# Patient Record
Sex: FEMALE | Race: WHITE | Employment: OTHER | URBAN - METROPOLITAN AREA
[De-identification: names, ages, dates, MRNs, and addresses within clinical notes are randomized per-mention and may not be internally consistent; named-entity substitution may affect disease eponyms.]

---

## 2018-02-22 PROBLEM — N13.30 HYDRONEPHROSIS: Status: ACTIVE | Noted: 2018-02-22

## 2018-06-19 PROBLEM — F41.9 ANXIETY: Status: ACTIVE | Noted: 2018-06-19

## 2018-08-23 PROBLEM — K11.6 RANULA: Status: ACTIVE | Noted: 2018-08-23

## 2021-01-07 PROBLEM — R50.9 FEVER: Status: ACTIVE | Noted: 2021-01-07

## 2021-01-07 PROBLEM — R06.02 SOB (SHORTNESS OF BREATH): Status: ACTIVE | Noted: 2021-01-07

## 2022-05-16 ENCOUNTER — APPOINTMENT (OUTPATIENT)
Dept: CT IMAGING | Age: 82
End: 2022-05-16
Payer: COMMERCIAL

## 2022-05-16 ENCOUNTER — HOSPITAL ENCOUNTER (OUTPATIENT)
Age: 82
Setting detail: OBSERVATION
Discharge: HOME | End: 2022-05-19
Attending: EMERGENCY MEDICINE | Admitting: SURGERY
Payer: COMMERCIAL

## 2022-05-16 ENCOUNTER — APPOINTMENT (OUTPATIENT)
Dept: GENERAL RADIOLOGY | Age: 82
End: 2022-05-16
Payer: COMMERCIAL
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
## 2022-05-16 DIAGNOSIS — S82.832A CLOSED FRACTURE OF DISTAL END OF LEFT FIBULA, UNSPECIFIED FRACTURE MORPHOLOGY, INITIAL ENCOUNTER: Primary | ICD-10-CM

## 2022-05-16 LAB — VITAMIN D 25-HYDROXY: 30.3 NG/ML

## 2022-05-16 PROCEDURE — 70450 CT HEAD/BRAIN W/O DYE: CPT

## 2022-05-16 PROCEDURE — 6370000000 HC RX 637 (ALT 250 FOR IP): Performed by: STUDENT IN AN ORGANIZED HEALTH CARE EDUCATION/TRAINING PROGRAM

## 2022-05-16 PROCEDURE — 82565 ASSAY OF CREATININE: CPT

## 2022-05-16 PROCEDURE — 73610 X-RAY EXAM OF ANKLE: CPT

## 2022-05-16 PROCEDURE — 99285 EMERGENCY DEPT VISIT HI MDM: CPT

## 2022-05-16 PROCEDURE — 72125 CT NECK SPINE W/O DYE: CPT

## 2022-05-16 PROCEDURE — 82947 ASSAY GLUCOSE BLOOD QUANT: CPT

## 2022-05-16 PROCEDURE — 6360000002 HC RX W HCPCS: Performed by: STUDENT IN AN ORGANIZED HEALTH CARE EDUCATION/TRAINING PROGRAM

## 2022-05-16 PROCEDURE — 84520 ASSAY OF UREA NITROGEN: CPT

## 2022-05-16 PROCEDURE — 85027 COMPLETE CBC AUTOMATED: CPT

## 2022-05-16 PROCEDURE — G0480 DRUG TEST DEF 1-7 CLASSES: HCPCS

## 2022-05-16 PROCEDURE — 73590 X-RAY EXAM OF LOWER LEG: CPT

## 2022-05-16 PROCEDURE — 96376 TX/PRO/DX INJ SAME DRUG ADON: CPT

## 2022-05-16 PROCEDURE — 3209999900 CT LUMBAR SPINE TRAUMA RECONSTRUCTION

## 2022-05-16 PROCEDURE — 85610 PROTHROMBIN TIME: CPT

## 2022-05-16 PROCEDURE — 74176 CT ABD & PELVIS W/O CONTRAST: CPT

## 2022-05-16 PROCEDURE — 73600 X-RAY EXAM OF ANKLE: CPT

## 2022-05-16 PROCEDURE — 96374 THER/PROPH/DIAG INJ IV PUSH: CPT

## 2022-05-16 PROCEDURE — 80051 ELECTROLYTE PANEL: CPT

## 2022-05-16 PROCEDURE — 96375 TX/PRO/DX INJ NEW DRUG ADDON: CPT

## 2022-05-16 PROCEDURE — 3209999900 CT THORACIC SPINE TRAUMA RECONSTRUCTION

## 2022-05-16 PROCEDURE — 82306 VITAMIN D 25 HYDROXY: CPT

## 2022-05-16 RX ORDER — FENTANYL CITRATE 50 UG/ML
50 INJECTION, SOLUTION INTRAMUSCULAR; INTRAVENOUS ONCE
Status: COMPLETED | OUTPATIENT
Start: 2022-05-16 | End: 2022-05-16

## 2022-05-16 RX ORDER — MORPHINE SULFATE 4 MG/ML
2 INJECTION, SOLUTION INTRAMUSCULAR; INTRAVENOUS ONCE
Status: COMPLETED | OUTPATIENT
Start: 2022-05-16 | End: 2022-05-16

## 2022-05-16 RX ORDER — OXYCODONE HYDROCHLORIDE 5 MG/1
5 TABLET ORAL ONCE
Status: COMPLETED | OUTPATIENT
Start: 2022-05-16 | End: 2022-05-16

## 2022-05-16 RX ORDER — ONDANSETRON 2 MG/ML
4 INJECTION INTRAMUSCULAR; INTRAVENOUS ONCE
Status: COMPLETED | OUTPATIENT
Start: 2022-05-16 | End: 2022-05-16

## 2022-05-16 RX ADMIN — FENTANYL CITRATE 50 MCG: 50 INJECTION, SOLUTION INTRAMUSCULAR; INTRAVENOUS at 17:39

## 2022-05-16 RX ADMIN — ONDANSETRON 4 MG: 2 INJECTION INTRAMUSCULAR; INTRAVENOUS at 17:39

## 2022-05-16 RX ADMIN — MORPHINE SULFATE 2 MG: 4 INJECTION INTRAVENOUS at 19:49

## 2022-05-16 RX ADMIN — OXYCODONE 5 MG: 5 TABLET ORAL at 22:54

## 2022-05-16 RX ADMIN — ONDANSETRON 4 MG: 2 INJECTION INTRAMUSCULAR; INTRAVENOUS at 19:49

## 2022-05-16 ASSESSMENT — PAIN SCALES - GENERAL: PAINLEVEL_OUTOF10: 9

## 2022-05-16 ASSESSMENT — ENCOUNTER SYMPTOMS
DIARRHEA: 0
SHORTNESS OF BREATH: 0
ABDOMINAL PAIN: 0
RHINORRHEA: 0
VOMITING: 0
BACK PAIN: 0
COUGH: 0
CONSTIPATION: 0
NAUSEA: 1
FACIAL SWELLING: 0

## 2022-05-16 ASSESSMENT — PAIN DESCRIPTION - LOCATION: LOCATION: ANKLE

## 2022-05-16 ASSESSMENT — PAIN - FUNCTIONAL ASSESSMENT: PAIN_FUNCTIONAL_ASSESSMENT: 0-10

## 2022-05-16 ASSESSMENT — PAIN DESCRIPTION - ORIENTATION: ORIENTATION: LEFT

## 2022-05-16 NOTE — ED NOTES
Patient transported by EMS after fall this afternoon. Pt states that she was walking up steps outside when her foot got caught on an uneven step and she fell onto right knee. Pt c/o left ankle pain with right hip and right knee pain. Pt denies LOC or hitting head. Pt denies being dizzy or any other symptoms at time of fall. PMS x4 intact. Pt A+Ox4 on arrival, respirations even and unlabored, speaking in complete sentences. Spouse at bedside.       Norman Rosas RN  05/16/22 [facility] Francesco RN  05/16/22 [id number]

## 2022-05-16 NOTE — H&P
TRAUMA HISTORY AND PHYSICAL EXAMINATION    PATIENT NAME: Radha Walton  YOB: 1940  MEDICAL RECORD NO. [id number]   DATE: 5/17/2022  PRIMARY CARE PHYSICIAN: Anand Rice MD  PATIENT EVALUATED AT THE REQUEST OF : Soha    ACTIVATION   []Trauma Alert     [] Trauma Priority     [x]Trauma Consult. IMPRESSION:     Patient Active Problem List   Diagnosis    Diabetes mellitus, type II ([facility]    HTN (hypertension)    Hypercholesteremia    Fatigue    Vitamin B12 deficiency    Vitamin D deficiency    Breast cancer ([facility]    Depression    Hydronephrosis    Anxiety    Ranula    Fever    SOB (shortness of breath)    Traumatic closed nondisplaced fracture of distal fibula, left, initial encounter       MEDICAL DECISION MAKING AND PLAN:     78 yo F, fall up 1 step. No LOC. On ASA and plavix  - Left distal fibula fracture extending into lateral malleolus  - CT head and C spine negative  - CT pan scan pending  - Orthopedic surgery consult  - LLE splinted at bedside by ortho  - [facility]    [] Neurosurgery     [x] Orthopedic Surgery    [] Cardiothoracic     [] Facial Trauma    [] Plastic Surgery (Burn)    [] Pediatric Surgery     [] Internal Medicine    [] Pulmonary Medicine    [] Other:        HISTORY:     Chief Complaint:  Left leg hurts    INJURY SUMMARY  Left distal fibula fracture extending into lateral malleolus    If intracranial hemorrhage is present, is it a:  [] BIG 1  [] BIG 2  [] BIG 3    GENERAL DATA  Age 79 y.o.  female   Patient information was obtained from patient and spouse/SO. History/Exam limitations: none.   Patient presented to the Emergency Department by ambulance where the patient received see Ambulance Run Sheet prior to arrival.  Injury Date: 5/17/2022   Approximate Injury Time: 5:00 PM        Transport mode:   [x]Ambulance      [] Helicopter     []Car       [] Other    INJURY LOCATION, (e.g., home, farm, industry, street)  Specific Details of Location (e.g., bedroom, kitchen, garage): Basement staircase  Type of Residence (if occurred in home setting) (e.g., apartment, mobile home, single family home): Single family home    MECHANISM OF INJURY         [x] Fall    []From Standing     []From Height  Ft     [x]Up Stairs _1__steps    HISTORY:     Rodriguez Palomo is a 79 y.o. female that presented to the Emergency Department following a fall earlier today. She was walking upstairs from her basement and tripped up one step, landing on her right knee and her left foot became caught on the step below. She denies head trauma, denies LOC. Denies any other injuries. She is on ASA and plavix for a history of DVT and PE. She endorses LLE pain and a lumbar paraspinal throbbing. She has a history of spinal stenosis with neurogenic claudication, s/p repair in 2019, hx of CVA, breast cancer, DVT and PE on ASA and plavix, CAD, DM, HTN, asthma. She denies CP, SOB, changes in vision, headache, neck pain, abdominal pain, vomiting, numbness, tingling, weakness. Loss of Consciousness [x]No   []Yes Duration(min)       [] Unknown     Total Fluids Given Prior To Arrival  mL    MEDICATIONS:   []  None     []  Information not available due to exam limitations documented above    Prior to Admission medications    Medication Sig Start Date End Date Taking? Authorizing Provider   vitamin D (ERGOCALCIFEROL) 1.25 MG (93556 UT) CAPS capsule Take 1 capsule by mouth once a week 4/18/22   Ebenezer Mcleod MD   clopidogrel (PLAVIX) 75 MG tablet qd 4/13/22   Ebenezer Mcleod MD   ALPRAZolam [facility]) 0.5 MG tablet Take 1 tablet by mouth nightly as needed for Sleep for up to 90 days.  4/13/22 7/12/22  Ebenezer Mcleod MD   atenolol (TENORMIN) 50 MG tablet take 1 tablet by mouth twice a day 3/12/22   Ebenezer Mcleod MD   metFORMIN (GLUCOPHAGE) 500 MG tablet take 1 tablet by mouth twice a day with food 3/12/22   Ebenezer Mcleod MD   hydroCHLOROthiazide (HYDRODIURIL) 25 MG tablet take 1 tablet by mouth once daily 2/16/22   Coni Jason MD   pravastatin (PRAVACHOL) 40 MG tablet take 1 tablet by mouth once daily 2/16/22   Coni Jason MD   amLODIPine (NORVASC) 5 MG tablet take 1 tablet by mouth once daily 2/16/22   Coni Jason MD   gabapentin (NEURONTIN) 300 MG capsule take 1 capsule by mouth three times a day 1/25/22 2/25/22  Coni Jason MD   ondansetron (ZOFRAN-ODT) 4 MG disintegrating tablet Take 4 mg by mouth every 8 hours as needed 10/11/21   Historical Provider, MD   sulfamethoxazole-trimethoprim (BACTRIM;SEPTRA) 400-80 MG per tablet take 1 tablet by mouth twice a day for 10 days 8/18/21   Coni Jason MD   acetaminophen-codeine (TYLENOL #3) 300-30 MG per tablet take 1 tablet by mouth every 4 to 6 hours if needed 6/29/21   Historical Provider, MD   amoxicillin (AMOXIL) 500 MG capsule take 2 capsule immediately then take 1 capsule by mouth three times a day until finished 6/21/21   Historical Provider, MD   penicillin v potassium (VEETID) 500 MG tablet take 1 tablet by mouth every 6 hours 6/29/21   Historical Provider, MD   Handicap Placard MISC by Does not apply route 5 years 8/2/21   Coni Jason MD   clindamycin (CLEOCIN) 300 MG capsule  6/20/21   Historical Provider, MD   tiotropium (SPIRIVA RESPIMAT) 1.25 MCG/ACT AERS inhaler Inhale into the lungs    Historical Provider, MD   FLUoxetine (PROZAC) 20 MG capsule take 1 capsule by mouth twice a day 1/28/21   Coni Jason MD   vitamin D (CHOLECALCIFEROL) 25 MCG (1000 UT) TABS tablet Take 1,000 Units by mouth daily    Historical Provider, MD   Fluticasone-Salmeterol 55-14 MCG/ACT AEPB Inhale 1 puff into the lungs 2 times daily 2/6/20   Historical Provider, MD   atenolol (TENORMIN) 50 MG tablet Take one tab po twice daily 9/21/20   Coni Jason MD   atenolol (TENORMIN) 50 MG tablet take 1 tablet by mouth twice a day 9/21/20   Coni Jason MD   Lancets MISC TEST ONCE DAILY BEFORE A MEAL AS DIRECTED BY PHYSICIAN; DX DM II (E11.9) 2/24/20   Carolyn Bishop MD   blood glucose monitor strips TEST ONCE DAILY BEFORE A MEAL AS DIRECTED BY PHYSICIAN; DX DM II (E11.9) 2/24/20   Carolyn Bishop MD   blood glucose monitor kit and supplies TEST ONCE DAILY BEFORE A MEAL AS DIRECTED BY PHYSICIAN; DX DM II (E11.9) 2/24/20   Carolyn Bishop MD   Umeclidinium Bromide (INCRUSE ELLIPTA) 62.5 MCG/INH AEPB Inhale 1 puff into the lungs Daily with lunch 11/11/19   Carolyn Bishop MD   nystatin-triamcinolone [facility] II) 874859-9.1 UNIT/GM-% cream Apply topically 2 times daily. Patient taking differently: Indications: prn Apply topically 2 times daily.  8/20/19   Carolyn Bishop MD   vitamin B-12 (CYANOCOBALAMIN) 100 MCG tablet Take 50 mcg by mouth daily    Historical Provider, MD   ascorbic acid (VITAMIN C) 500 MG tablet Take 500 mg by mouth daily    Historical Provider, MD   trimethoprim (TRIMPEX) 100 MG tablet Take 100 mg by mouth  2/20/18   Historical Provider, MD   albuterol sulfate HFA (PROAIR HFA) 108 (90 Base) MCG/ACT inhaler Inhale 2 puffs into the lungs every 6 hours as needed for Wheezing 2/22/18   Carolyn Bishop MD   aspirin 81 MG EC tablet take 1 tablet by mouth once daily 2/15/18   Historical Provider, MD       ALLERGIES:   []  None    []   Information not available due to exam limitations documented above     Azithromycin, Compazine [prochlorperazine maleate], Mirabegron, Oxybutynin, and Prochlorperazine    PAST MEDICAL HISTORY: []  None   []   Information not available due to exam limitations documented above      has a past medical history of Allergic rhinitis, Anxiety, Arthritis, Asthma, Breast cancer ([address] Breast cancer genetic susceptibility, CAD (coronary artery disease), Cancer ([address] Deep vein blood clot of right lower extremity ([address] Diabetes mellitus ([address] Diabetes mellitus type II, Fatigue, HTN (hypertension), Hypercholesteremia, Hyperlipidemia, Hypertension, Psychiatric problem, Pulmonary emboli ([address] Unspecified cerebral artery occlusion with cerebral infarction, Vitamin B12 deficiency, and Vitamin D deficiency. has a past surgical history that includes Cardiac catheterization (Right); Breast lumpectomy (Right); Breast lumpectomy; and lymph node dissection (Right). FAMILY HISTORY   []   Information not available due to exam limitations documented above    family history includes Cancer in an other family member; Heart Disease in her father and mother. SOCIAL HISTORY  []   Information not available due to exam limitations documented above     reports that she has never smoked. She has never used smokeless tobacco.   reports no history of alcohol use. reports no history of drug use. Review of Systems:    []   Information not available due to exam limitations documented above    Review of Systems   Constitutional: Negative for activity change, appetite change, chills and fever. HENT: Negative for congestion, facial swelling, hearing loss, nosebleeds and rhinorrhea. Eyes: Negative for visual disturbance. Respiratory: Negative for cough and shortness of breath. Cardiovascular: Negative for chest pain and palpitations. Gastrointestinal: Positive for nausea. Negative for abdominal pain, constipation, diarrhea and vomiting. Genitourinary: Negative for dysuria. Musculoskeletal: Positive for arthralgias (LLE pain) and myalgias (Right sided paraspinal lumbar pain). Negative for back pain, neck pain and neck stiffness. Skin: Negative for wound. Neurological: Negative for dizziness, tremors, seizures, syncope, speech difficulty, weakness, light-headedness, numbness and headaches.            PHYSICAL EXAMINATION:     GLASCOW COMA SCALE  NEUROMUSCULAR BLOCKADE PRIOR TO ARRIVAL     [x]No        []Yes      Variable  Score   Variable  Score  Eye opening [x]Spontaneous 4 Verbal  [x]Oriented  5     []To voice  3   []Confused  4    []To pain  2   []Inapp words  3    []None  1   []Incomp CHEST ABDOMEN PELVIS WO CONTRAST   Final Result   CT of thoracic spine: No acute osseous abnormality. No acute fracture. CT of lumbar spine: No acute osseous abnormality. No acute fracture. Changes related to instrumented disc space and posterior fusion and posterior   decompression at L3-L4 and L4-L5. CT of chest abdomen and pelvis:      No acute traumatic injury within the chest abdomen and pelvis. Extensive abnormalities within the right breast with multiple nodular soft   tissues with foci of calcification. Extensive abnormalities within the right breast with multiple nodular soft   tissues with foci of calcification. Mammographic correlation is recommended. CT LUMBAR SPINE TRAUMA RECONSTRUCTION   Final Result   CT of thoracic spine: No acute osseous abnormality. No acute fracture. CT of lumbar spine: No acute osseous abnormality. No acute fracture. Changes related to instrumented disc space and posterior fusion and posterior   decompression at L3-L4 and L4-L5. CT of chest abdomen and pelvis:      No acute traumatic injury within the chest abdomen and pelvis. Extensive abnormalities within the right breast with multiple nodular soft   tissues with foci of calcification. Extensive abnormalities within the right breast with multiple nodular soft   tissues with foci of calcification. Mammographic correlation is recommended. CT THORACIC SPINE TRAUMA RECONSTRUCTION   Final Result   CT of thoracic spine: No acute osseous abnormality. No acute fracture. CT of lumbar spine: No acute osseous abnormality. No acute fracture. Changes related to instrumented disc space and posterior fusion and posterior   decompression at L3-L4 and L4-L5. CT of chest abdomen and pelvis:      No acute traumatic injury within the chest abdomen and pelvis.       Extensive abnormalities within the right breast with multiple nodular soft   tissues with foci of calcification. Extensive abnormalities within the right breast with multiple nodular soft   tissues with foci of calcification. Mammographic correlation is recommended. XR ANKLE LEFT (MIN 3 VIEWS)   Final Result   No significant change in alignment of a Stein type B fracture of the distal   left fibula status post splinting. Overlying casting material obscures fine   bony detail. XR ANKLE LEFT (MIN 3 VIEWS)   Final Result   Fracture of the distal fibula extending into the lateral malleolus         XR ANKLE LEFT (MIN 3 VIEWS)   Final Result   Fracture of the distal fibula extending into the lateral malleolus         XR TIBIA FIBULA LEFT (2 VIEWS)   Final Result   Fracture of the distal fibula extending into the lateral malleolus         CT HEAD WO CONTRAST   Final Result   No acute intracranial abnormality. Generalized atrophy and chronic small vessel ischemic white matter disease. RECOMMENDATIONS:   Unavailable         CT CERVICAL SPINE WO CONTRAST   Final Result   Multilevel degenerative changes with no acute fracture or traumatic   malalignment.                LABS    Labs Reviewed   VITAMIN D [facility] [place]   TRAUMA PANEL         Sanchez Moss MD  5/16/22, 2:24 AM

## 2022-05-16 NOTE — CONSULTS
Orthopedic Surgery Consult  (Dr. Robert Jimenez)                   CC/Reason for consult:  FFSH/Left ankle fracture    HPI:    The patient is a 79 y.o. female who we are consulted on for evaluation and management of a left ankle injury after she sustained a fall from standing height. She states she was at home and twisted her foot on the first step of the stairs and fell down. She had immediate pain and swelling to the left ankle after the fall and was unable to walk. She has no prior history of left ankle injury. She lives at home with her . Denies numbness/tingling. She ambulates on her own at baseline without assistive devices. Past Medical History:    Past Medical History:   Diagnosis Date    Allergic rhinitis     pollen    Anxiety     Arthritis     Asthma     Breast cancer ([facility] 9/10/2014    Breast cancer genetic susceptibility     CAD (coronary artery disease)     Cancer ([facility]     Deep vein blood clot of right lower extremity ([facility]     Diabetes mellitus ([facility]     prediabetes    Diabetes mellitus type II 9/10/2014    Fatigue 9/10/2014    HTN (hypertension) 9/10/2014    Hypercholesteremia 9/10/2014    Hyperlipidemia     Hypertension     Psychiatric problem     depression, anxiety    Pulmonary emboli ([address]     Unspecified cerebral artery occlusion with cerebral infarction     Vitamin B12 deficiency 9/10/2014    Vitamin D deficiency 9/10/2014       Past Surgical History:    Past Surgical History:   Procedure Laterality Date    BREAST LUMPECTOMY Right     BREAST LUMPECTOMY      CARDIAC CATHETERIZATION Right     LYMPH NODE DISSECTION Right     right armpit       Medications Prior to Admission:   Prior to Admission medications    Medication Sig Start Date End Date Taking?  Authorizing Provider   vitamin D (ERGOCALCIFEROL) 1.25 MG (21868 UT) CAPS capsule Take 1 capsule by mouth once a week 4/18/22   Kylah Pittman MD   clopidogrel (PLAVIX) 75 MG tablet qd 4/13/22   Kylah Pittman MD ALPRAZolam (XANAX) 0.5 MG tablet Take 1 tablet by mouth nightly as needed for Sleep for up to 90 days.  4/13/22 7/12/22  Gilson Connolly MD   atenolol (TENORMIN) 50 MG tablet take 1 tablet by mouth twice a day 3/12/22   Gilson Connolly MD   metFORMIN (GLUCOPHAGE) 500 MG tablet take 1 tablet by mouth twice a day with food 3/12/22   Gilson Connolly MD   hydroCHLOROthiazide (HYDRODIURIL) 25 MG tablet take 1 tablet by mouth once daily 2/16/22   Gilson Connolly MD   pravastatin (PRAVACHOL) 40 MG tablet take 1 tablet by mouth once daily 2/16/22   Gilson Connolly MD   amLODIPine (NORVASC) 5 MG tablet take 1 tablet by mouth once daily 2/16/22   Gilson Connolly MD   gabapentin (NEURONTIN) 300 MG capsule take 1 capsule by mouth three times a day 1/25/22 2/25/22  Gilson Connolly MD   ondansetron (ZOFRAN-ODT) 4 MG disintegrating tablet Take 4 mg by mouth every 8 hours as needed 10/11/21   Historical Provider, MD   sulfamethoxazole-trimethoprim (BACTRIM;SEPTRA) 400-80 MG per tablet take 1 tablet by mouth twice a day for 10 days 8/18/21   Gilson Connolly MD   acetaminophen-codeine (TYLENOL #3) 300-30 MG per tablet take 1 tablet by mouth every 4 to 6 hours if needed 6/29/21   Historical Provider, MD   amoxicillin (AMOXIL) 500 MG capsule take 2 capsule immediately then take 1 capsule by mouth three times a day until finished 6/21/21   Historical Provider, MD   penicillin v potassium (VEETID) 500 MG tablet take 1 tablet by mouth every 6 hours 6/29/21   Historical Provider, MD   Handicap Placard MISC by Does not apply route 5 years 8/2/21   Gilson Connolly MD   clindamycin (CLEOCIN) 300 MG capsule  6/20/21   Historical Provider, MD   tiotropium (SPIRIVA RESPIMAT) 1.25 MCG/ACT AERS inhaler Inhale into the lungs    Historical Provider, MD   FLUoxetine (PROZAC) 20 MG capsule take 1 capsule by mouth twice a day 1/28/21   Gilson Connolly MD   vitamin D (CHOLECALCIFEROL) 25 MCG (1000 UT) TABS tablet Take 1,000 Units by mouth daily    Historical Provider, MD   Fluticasone-Salmeterol 55-14 MCG/ACT AEPB Inhale 1 puff into the lungs 2 times daily 2/6/20   Historical Provider, MD   atenolol (TENORMIN) 50 MG tablet Take one tab po twice daily 9/21/20   Kathy Muhammad MD   atenolol (TENORMIN) 50 MG tablet take 1 tablet by mouth twice a day 9/21/20   Kathy Muhammad MD   Lancets MISC TEST ONCE DAILY BEFORE A MEAL AS DIRECTED BY PHYSICIAN; DX DM II (E11.9) 2/24/20   Kathy Muhammad MD   blood glucose monitor strips TEST ONCE DAILY BEFORE A MEAL AS DIRECTED BY PHYSICIAN; DX DM II (E11.9) 2/24/20   Kathy Muhammad MD   blood glucose monitor kit and supplies TEST ONCE DAILY BEFORE A MEAL AS DIRECTED BY PHYSICIAN; DX DM II (E11.9) 2/24/20   Kathy Muhammad MD   Umeclidinium Bromide (INCRUSE ELLIPTA) 62.5 MCG/INH AEPB Inhale 1 puff into the lungs Daily with lunch 11/11/19   Kathy Muhammad MD   nystatin-triamcinolone [facility] II) 295705-8.1 UNIT/GM-% cream Apply topically 2 times daily. Patient taking differently: Indications: prn Apply topically 2 times daily.  8/20/19   Kathy Muhammad MD   vitamin B-12 (CYANOCOBALAMIN) 100 MCG tablet Take 50 mcg by mouth daily    Historical Provider, MD   ascorbic acid (VITAMIN C) 500 MG tablet Take 500 mg by mouth daily    Historical Provider, MD   trimethoprim (TRIMPEX) 100 MG tablet Take 100 mg by mouth  2/20/18   Historical Provider, MD   albuterol sulfate HFA (PROAIR HFA) 108 (90 Base) MCG/ACT inhaler Inhale 2 puffs into the lungs every 6 hours as needed for Wheezing 2/22/18   Kathy Muhammad MD   aspirin 81 MG EC tablet take 1 tablet by mouth once daily 2/15/18   Historical Provider, MD       Allergies:    Azithromycin, Compazine [prochlorperazine maleate], Mirabegron, Oxybutynin, and Prochlorperazine    Social History:   Social History     Socioeconomic History    Marital status:      Spouse name: Not on file    Number of children: Not on file    Years of education: Not on file    Highest education level: Not on file   Occupational History    Not on file   Tobacco Use    Smoking status: Never Smoker    Smokeless tobacco: Never Used   Substance and Sexual Activity    Alcohol use: No    Drug use: No    Sexual activity: Not on file   Other Topics Concern    Not on file   Social History Narrative    Not on file     Social Determinants of Health     Financial Resource Strain: Low Risk     Difficulty of Paying Living Expenses: Not hard at all   Food Insecurity: No Food Insecurity    Worried About [address] in the Last Year: Never true    [facility] in the Last Year: Never true   Transportation Needs:     Lack of Transportation (Medical): Not on file    Lack of Transportation (Non-Medical): Not on file   Physical Activity:     Days of Exercise per Week: Not on file    Minutes of Exercise per Session: Not on file   Stress:     Feeling of Stress : Not on file   Social Connections:     Frequency of Communication with Friends and Family: Not on file    Frequency of Social Gatherings with Friends and Family: Not on file    Attends Confucianism Services: Not on file    Active Member of [address] [place] [address] or Organizations: Not on file    Attends Club or Organization Meetings: Not on file    Marital Status: Not on file   Intimate Partner Violence:     Fear of Current or Ex-Partner: Not on file    Emotionally Abused: Not on file    Physically Abused: Not on file    Sexually Abused: Not on file   Housing Stability:     Unable to Pay for Housing in the Last Year: Not on file    Number of [place] in the Last Year: Not on file    Unstable Housing in the Last Year: Not on file       Family History:  Family History   Problem Relation Age of Onset    Heart Disease Mother     Heart Disease Father     Cancer Other        REVIEW OF SYSTEMS:    General: Negative for fever and chills. Cardiovascular: Negative for chest pain and palpitations.    Musculoskeletal: Positive for left ankle pain. See HPI   Neurological: Negative for numbness & tingling. 10 remaining systems reviewed and negative    PHYSICAL EXAM:  /71   Pulse 65   Temp 98.6 °F (37 °C) (Oral)   Resp 20   SpO2 97%     Gen: AAOx3, NAD, cooperative   Head: Normocephalic  Chest: Non labored breathing  Cardiovascular: Regular rate, no dependent edema, distal pulses 2+  Respiratory: Chest symmetric, no accessory muscle use, normal respirations     LLE: Ecchymosis and edema to the left ankle. TTP to the lateral malleolus. No TTP tot he medial malleolus. Non TTP to the knee. No ankle ROM due to pain. Able to wiggle all her toes freely. Skin intact. Compartments soft and compressible. Sural, saphenous, superificial/deep peroneal, and plantar nerve distribution SILT. Foot and toes warm and well-perfused w/ BCR; DP pulses 2+. -log roll. LABS:  No results for input(s): WBC, HGB, HCT, PLT, INR, PTT, NA, K, BUN, CREATININE, GLUCOSE, SEDRATE, CRP in the last 72 hours. Invalid input(s): PT     Radiology:    Xray imaging of the left ankle & tib-fib demonstrates a minimally displaced left distal fibula fracture. External rotation stress view of the ankle did demonstrate displacement of the lateral malleolus fracture as well as widening of the medial clear space. A/P: 79 y.o. female being seen after a fall from standing height with the following:    -Left bimalleolar equivalent ankle fracture    -No acute orthopedic intervention at this time. Will follow her outpatient for her left ankle fracture.  -Weight bearing: NWB LLE  -Short leg splint applied to the LLE and molded appropriately. Patient tolerated this well. Maintain splint to LLE.   -Pain control per ED  -Ice and elevation for pain/swelling  -F/u with Dr. Kalin García in 7-10 days  -Please page Ortho with any questions or concerns    Merary Saleh DO,   PGY-3 Orthopedic Surgery  6:25 PM 5/16/2022

## 2022-05-16 NOTE — ED PROVIDER NOTES
[address] [facility]     Emergency Department     Faculty Attestation        I performed a history and physical examination of the patient and discussed management with the resident. I reviewed the residents note and agree with the documented findings and plan of care. Any areas of disagreement are noted on the chart. I was personally present for the key portions of any procedures. I have documented in the chart those procedures where I was not present during the key portions. I have reviewed the emergency nurses triage note. I agree with the chief complaint, past medical history, past surgical history, allergies, medications, social and family history as documented unless otherwise noted below. For mid-level providers such as nurse practitioners as well as physicians assistants:    I have personally seen and evaluated the patient. I find the patient's history and physical exam are consistent with NP/PA documentation. I agree with the care provided, treatment rendered, disposition, & follow-up plan. Additional findings are as noted. Vital Signs: /71   Pulse 65   Temp 98.6 °F (37 °C) (Oral)   Resp 20   SpO2 97%   PCP:  Coni Jason MD    Pertinent Comments:     Mechanical fall with left lateral ankle pain. Awake alert and oriented.   No pain in the hip abdomen pelvis or chest      Critical Care  None          Jailyn Cole MD    Attending Emergency Medicine Physician              Marcelino Mills MD  05/16/22 8858

## 2022-05-16 NOTE — PROGRESS NOTES
I signed up for this patient in error. I did not contribute to the patient's care today.     Pamela Angel MD  Emergency Medicine PGY-2

## 2022-05-16 NOTE — ED PROVIDER NOTES
[facility]  ED  Emergency Department Encounter  EmergencyMedicine Resident     Pt Kiana Centeno  MRN: [id number]  Ana Lauragfjeannette 1940  Date of evaluation: 5/16/22  PCP:  Tyrus Phalen, MD    CHIEF COMPLAINT       Chief Complaint   Patient presents with    Ankle Pain    Fall       HISTORY OF PRESENT ILLNESS  (Location/Symptom, Timing/Onset, Context/Setting, Quality, Duration, Modifying Factors, Severity.)    This patient was evaluated in the Emergency Department for symptoms described in the history of present illness. He/she was evaluated in the context of the global COVID-19 pandemic, which necessitated consideration that the patient might be at risk for infection with the SARS-CoV-2 virus that causes COVID-19. Institutional protocols and algorithms that pertain to the evaluation of patients at risk for COVID-19 are in a state of rapid change based on information released by regulatory bodies including the CDC and federal and state organizations. These policies and algorithms were followed during the patient's care in the ED. Binh Juarez is a 79 y.o. female who presents to the ED today with complaints of severe left ankle pain after mechanical fall. Patient was outside gardening when she missed a step rolling her left ankle. Has severe pain that is 9/10 in severity, worse with movement. No associated numbness, tingling, weakness. Did fall but did not hit her head or neck although she does have a mild right-sided headache. Is on aspirin and Plavix for history of stroke. No neck or back pain. Denies any other injury.     PAST MEDICAL / SURGICAL / SOCIAL / FAMILY HISTORY      has a past medical history of Allergic rhinitis, Anxiety, Arthritis, Asthma, Breast cancer ([address] Breast cancer genetic susceptibility, CAD (coronary artery disease), Cancer ([address] Deep vein blood clot of right lower extremity ([address] Diabetes mellitus ([address] Diabetes mellitus type II, Fatigue, HTN (hypertension), Hypercholesteremia, Hyperlipidemia, Hypertension, Psychiatric problem, Pulmonary emboli ([address] Unspecified cerebral artery occlusion with cerebral infarction, Vitamin B12 deficiency, and Vitamin D deficiency. has a past surgical history that includes Cardiac catheterization (Right); Breast lumpectomy (Right); Breast lumpectomy; and lymph node dissection (Right). Social History     Socioeconomic History    Marital status:      Spouse name: Not on file    Number of children: Not on file    Years of education: Not on file    Highest education level: Not on file   Occupational History    Not on file   Tobacco Use    Smoking status: Never Smoker    Smokeless tobacco: Never Used   Substance and Sexual Activity    Alcohol use: No    Drug use: No    Sexual activity: Not on file   Other Topics Concern    Not on file   Social History Narrative    Not on file     Social Determinants of Health     Financial Resource Strain: Low Risk     Difficulty of Paying Living Expenses: Not hard at all   Food Insecurity: No Food Insecurity    Worried About [address] in the Last Year: Never true    [address] in the Last Year: Never true   Transportation Needs:     Lack of Transportation (Medical): Not on file    Lack of Transportation (Non-Medical):  Not on file   Physical Activity:     Days of Exercise per Week: Not on file    Minutes of Exercise per Session: Not on file   Stress:     Feeling of Stress : Not on file   Social Connections:     Frequency of Communication with Friends and Family: Not on file    Frequency of Social Gatherings with Friends and Family: Not on file    Attends Orthodox Services: Not on file    Active Member of Clubs or Organizations: Not on file    Attends Club or Organization Meetings: Not on file    Marital Status: Not on file   Intimate Partner Violence:     Fear of Current or Ex-Partner: Not on file    Emotionally Abused: Not on file   Ardyth Moritz Physically Abused: Not on file    Sexually Abused: Not on file   Housing Stability:     Unable to Pay for Housing in the Last Year: Not on file    Number of Places Lived in the Last Year: Not on file    Unstable Housing in the Last Year: Not on file       Family History   Problem Relation Age of Onset    Heart Disease Mother     Heart Disease Father     Cancer Other        Allergies:  Azithromycin, Compazine [prochlorperazine maleate], Mirabegron, Oxybutynin, and Prochlorperazine    Home Medications:  Prior to Admission medications    Medication Sig Start Date End Date Taking? Authorizing Provider   vitamin D (ERGOCALCIFEROL) 1.25 MG (97104 UT) CAPS capsule Take 1 capsule by mouth once a week 4/18/22   Shwetha Ortiz MD   clopidogrel (PLAVIX) 75 MG tablet qd 4/13/22   Shwetha Ortiz MD   ALPRAZolam Alivia De Los Santos) 0.5 MG tablet Take 1 tablet by mouth nightly as needed for Sleep for up to 90 days.  4/13/22 7/12/22  Shwetha Ortiz MD   atenolol (TENORMIN) 50 MG tablet take 1 tablet by mouth twice a day 3/12/22   Shwetha Ortiz MD   metFORMIN (GLUCOPHAGE) 500 MG tablet take 1 tablet by mouth twice a day with food 3/12/22   Shwetha Ortiz MD   hydroCHLOROthiazide (HYDRODIURIL) 25 MG tablet take 1 tablet by mouth once daily 2/16/22   Shwetha Ortiz MD   pravastatin (PRAVACHOL) 40 MG tablet take 1 tablet by mouth once daily 2/16/22   Shwetha Ortiz MD   amLODIPine (NORVASC) 5 MG tablet take 1 tablet by mouth once daily 2/16/22   Shwetha Ortiz MD   gabapentin (NEURONTIN) 300 MG capsule take 1 capsule by mouth three times a day 1/25/22 2/25/22  Shwetha Ortiz MD   ondansetron (ZOFRAN-ODT) 4 MG disintegrating tablet Take 4 mg by mouth every 8 hours as needed 10/11/21   Historical Provider, MD   sulfamethoxazole-trimethoprim (BACTRIM;SEPTRA) 400-80 MG per tablet take 1 tablet by mouth twice a day for 10 days 8/18/21   Shwetha Ortiz MD   acetaminophen-codeine (TYLENOL #3) 300-30 MG per tablet take 1 tablet by mouth every 4 to 6 hours if needed 6/29/21   Historical Provider, MD   amoxicillin (AMOXIL) 500 MG capsule take 2 capsule immediately then take 1 capsule by mouth three times a day until finished 6/21/21   Historical Provider, MD   penicillin v potassium (VEETID) 500 MG tablet take 1 tablet by mouth every 6 hours 6/29/21   Historical Provider, MD   Trini Tomlin [facility] by Does not apply route 5 years 8/2/21   Antoine Ramírez MD   clindamycin (CLEOCIN) 300 MG capsule  6/20/21   Historical Provider, MD   tiotropium (SPIRIVA RESPIMAT) 1.25 MCG/ACT AERS inhaler Inhale into the lungs    Historical Provider, MD   FLUoxetine (PROZAC) 20 MG capsule take 1 capsule by mouth twice a day 1/28/21   Antoine Ramírez MD   vitamin D (CHOLECALCIFEROL) 25 MCG (1000 UT) TABS tablet Take 1,000 Units by mouth daily    Historical Provider, MD   Fluticasone-Salmeterol 55-14 MCG/ACT AEPB Inhale 1 puff into the lungs 2 times daily 2/6/20   Historical Provider, MD   atenolol (TENORMIN) 50 MG tablet Take one tab po twice daily 9/21/20   Antoine Ramírez MD   atenolol (TENORMIN) 50 MG tablet take 1 tablet by mouth twice a day 9/21/20   Antoine Ramírez MD   Lancets MISC TEST ONCE DAILY BEFORE A MEAL AS DIRECTED BY PHYSICIAN; DX DM II (E11.9) 2/24/20   Antoine Ramírez MD   blood glucose monitor strips TEST ONCE DAILY BEFORE A MEAL AS DIRECTED BY PHYSICIAN; DX DM II (E11.9) 2/24/20   Antoine Ramírez MD   blood glucose monitor kit and supplies TEST ONCE DAILY BEFORE A MEAL AS DIRECTED BY PHYSICIAN; DX DM II (E11.9) 2/24/20   Antoine Ramírez MD   Umeclidinium Bromide (INCRUSE ELLIPTA) 62.5 MCG/INH AEPB Inhale 1 puff into the lungs Daily with lunch 11/11/19   Antoine Ramírez MD   nystatin-triamcinolone [facility] II) 319603-0.1 UNIT/GM-% cream Apply topically 2 times daily. Patient taking differently: Indications: prn Apply topically 2 times daily.  8/20/19   Antoine Ramírez MD   vitamin B-12 (CYANOCOBALAMIN) 100 MCG tablet Take 50 mcg by mouth daily    Historical Provider, MD   ascorbic acid (VITAMIN C) 500 MG tablet Take 500 mg by mouth daily    Historical Provider, MD   trimethoprim (TRIMPEX) 100 MG tablet Take 100 mg by mouth  2/20/18   Historical Provider, MD   albuterol sulfate HFA (PROAIR HFA) 108 (90 Base) MCG/ACT inhaler Inhale 2 puffs into the lungs every 6 hours as needed for Wheezing 2/22/18   Seth Bell MD   aspirin 81 MG EC tablet take 1 tablet by mouth once daily 2/15/18   Historical Provider, MD       REVIEW OF SYSTEMS    (2-9 systems for level 4, 10 or more for level 5)      Review of Systems   Constitutional: Negative for chills and fever. HENT: Negative for congestion. Eyes: Negative for visual disturbance. Respiratory: Negative for cough and shortness of breath. Cardiovascular: Negative for chest pain. Gastrointestinal: Negative for abdominal pain, diarrhea, nausea and vomiting. Musculoskeletal: Negative for back pain, neck pain and neck stiffness. Left ankle pain   Skin: Negative for rash. Neurological: Positive for headaches. Negative for dizziness, weakness and numbness. Hematological:        On ASA and plavix       PHYSICAL EXAM   (up to 7 for level 4, 8 or more for level 5)      INITIAL VITALS:   /65   Pulse 60   Temp 98.6 °F (37 °C) (Oral)   Resp 20   SpO2 90%     Physical Exam  Constitutional:       General: She is not in acute distress. Comments: Sitting in bed. Alert and oriented. Answering questions appropriately. Appears uncomfortable secondary to ankle pain. HENT:      Head: Normocephalic and atraumatic. Comments: No davila sign or raccoon eyes     Nose: Nose normal.      Mouth/Throat:      Mouth: Mucous membranes are moist.      Pharynx: No oropharyngeal exudate or posterior oropharyngeal erythema. Eyes:      Extraocular Movements: Extraocular movements intact.       Conjunctiva/sclera: Conjunctivae normal.      Pupils: Pupils are equal, round, and reactive to light. Cardiovascular:      Rate and Rhythm: Normal rate and regular rhythm. Pulses: Normal pulses. Pulmonary:      Effort: Pulmonary effort is normal. No respiratory distress. Breath sounds: No stridor. No wheezing, rhonchi or rales. Abdominal:      General: There is no distension. Palpations: Abdomen is soft. Tenderness: There is no abdominal tenderness. There is no guarding or rebound. Musculoskeletal:      Cervical back: Normal range of motion. No muscular tenderness. Comments: Deformity of left ankle. Severe tenderness to the lateral aspect. 2+ DP PT pulses. Capillary refill less than 2 seconds. Sensation to light touch is intact. No tenderness to proximal left tibia or fibula. No tenderness at the knee. Pelvis is stable  Right lower extremity and bilateral upper extremities are atraumatic, neurovascular intact. No point tenderness along C-spine, T-spine, L-spine. Skin:     General: Skin is warm and dry. Findings: No rash. Neurological:      Mental Status: She is alert and oriented to person, place, and time. Sensory: No sensory deficit.    Psychiatric:         Behavior: Behavior normal.          DIFFERENTIAL  DIAGNOSIS     PLAN (LABS / IMAGING / EKG):  Orders Placed This Encounter   Procedures    CT HEAD WO CONTRAST    CT CERVICAL SPINE WO CONTRAST    XR ANKLE LEFT (MIN 3 VIEWS)    XR TIBIA FIBULA LEFT (2 VIEWS)    XR ANKLE LEFT (MIN 3 VIEWS)    XR ANKLE LEFT (MIN 3 VIEWS)    CT CHEST ABDOMEN PELVIS WO CONTRAST    CT LUMBAR SPINE TRAUMA RECONSTRUCTION    CT THORACIC SPINE TRAUMA RECONSTRUCTION    Vitamin D 25 Hydroxy    TRAUMA PANEL    Inpatient consult to Orthopedic Surgery    Inpatient consult to Trauma Surgery    Insert peripheral IV    Place in Observation Service       MEDICATIONS ORDERED:  Orders Placed This Encounter   Medications    ondansetron (ZOFRAN) injection 4 mg    fentaNYL (SUBLIMAZE) injection 50 mcg  morphine injection 2 mg    ondansetron (ZOFRAN) injection 4 mg    oxyCODONE (ROXICODONE) immediate release tablet 5 mg         DIAGNOSTIC RESULTS / EMERGENCY DEPARTMENT COURSE / MDM     Results for orders placed or performed during the hospital encounter of 05/16/22   Vitamin D 25 Hydroxy   Result Value Ref Range    Vit D, 25-Hydroxy 30.3 >29.9 ng/mL         RADIOLOGY:  CT CHEST ABDOMEN PELVIS WO CONTRAST   Final Result   CT of thoracic spine: No acute osseous abnormality. No acute fracture. CT of lumbar spine: No acute osseous abnormality. No acute fracture. Changes related to instrumented disc space and posterior fusion and posterior   decompression at L3-L4 and L4-L5. CT of chest abdomen and pelvis:      No acute traumatic injury within the chest abdomen and pelvis. Extensive abnormalities within the right breast with multiple nodular soft   tissues with foci of calcification. Extensive abnormalities within the right breast with multiple nodular soft   tissues with foci of calcification. Mammographic correlation is recommended. CT LUMBAR SPINE TRAUMA RECONSTRUCTION   Final Result   CT of thoracic spine: No acute osseous abnormality. No acute fracture. CT of lumbar spine: No acute osseous abnormality. No acute fracture. Changes related to instrumented disc space and posterior fusion and posterior   decompression at L3-L4 and L4-L5. CT of chest abdomen and pelvis:      No acute traumatic injury within the chest abdomen and pelvis. Extensive abnormalities within the right breast with multiple nodular soft   tissues with foci of calcification. Extensive abnormalities within the right breast with multiple nodular soft   tissues with foci of calcification. Mammographic correlation is recommended. CT THORACIC SPINE TRAUMA RECONSTRUCTION   Final Result   CT of thoracic spine: No acute osseous abnormality. No acute fracture.       CT of lumbar spine: No acute osseous abnormality. No acute fracture. Changes related to instrumented disc space and posterior fusion and posterior   decompression at L3-L4 and L4-L5. CT of chest abdomen and pelvis:      No acute traumatic injury within the chest abdomen and pelvis. Extensive abnormalities within the right breast with multiple nodular soft   tissues with foci of calcification. Extensive abnormalities within the right breast with multiple nodular soft   tissues with foci of calcification. Mammographic correlation is recommended. XR ANKLE LEFT (MIN 3 VIEWS)   Final Result   No significant change in alignment of a Stein type B fracture of the distal   left fibula status post splinting. Overlying casting material obscures fine   bony detail. XR ANKLE LEFT (MIN 3 VIEWS)   Final Result   Fracture of the distal fibula extending into the lateral malleolus         XR ANKLE LEFT (MIN 3 VIEWS)   Final Result   Fracture of the distal fibula extending into the lateral malleolus         XR TIBIA FIBULA LEFT (2 VIEWS)   Final Result   Fracture of the distal fibula extending into the lateral malleolus         CT HEAD WO CONTRAST   Final Result   No acute intracranial abnormality. Generalized atrophy and chronic small vessel ischemic white matter disease. RECOMMENDATIONS:   Unavailable         CT CERVICAL SPINE WO CONTRAST   Final Result   Multilevel degenerative changes with no acute fracture or traumatic   malalignment. IMPRESSION/MDM/EMERGENCY DEPARTMENT COURSE:  Patient came to emergency department, HPI and physical exam were conducted. All nursing notes were reviewed. 41-year-old female presenting the emergency department with complaints of severe left ankle pain secondary to mechanical fall just prior to arrival.  Is on aspirin and Plavix. Did not hit head although has mild headache at this time. Denies any other injury. Patient appears uncomfortable.   Vitals within normal limits. Physical exam unremarkable with exception of exquisite left ankle tenderness and limited range of motion. Neurovascular intact. Suspect left ankle fracture with possible dislocation. Will obtain imaging of ankle and knee to evaluate for Maisonneuve fracture in addition to ankle injury. Given patient's age, mild headache, on anticoagulation and distracting injury will also obtain CT head and cervical spine. Will likely discuss with orthopedic team if fracture present. Possible admission for PT OT. ED Course as of 05/17/22 0237   Mon May 16, 2022   1808 X-rays concerning for distal left fibular fracture, will consult Ortho [ZT]   1837 CT head and cervical spine negative. Orthopedic team down evaluating ankle and placing splint. Patient to be nonweight bearing until followup in 1w. If unable to ambulate will consider observation admission after trauma consult but otherwise patient can likely be discharged home if able to ambulate with crutches and minimal difficulty. [ZT]   1936 Trauma at bedside to evaluate pt. [ZT]      ED Course User Index  [ZT] Bernestine Duty, DO     After discussion with patient she does not think that she will be able to ambulate with crutches especially given nonweightbearing status. She has difficulty ambulating as it is and needs a little and her  sometimes for balance. Trauma consult. Plan for admission. Possibly observation unit for PT/OT. Will need outpatient Ortho follow-up. Remain nonweightbearing. Trauma team adding labs and additional imaging. Trauma to admit pending additional imaging. CONSULTS:  IP CONSULT TO ORTHOPEDIC SURGERY  IP CONSULT TO TRAUMA SURGERY    FINAL IMPRESSION      1. Closed fracture of distal end of left fibula, unspecified fracture morphology, initial encounter    2.  Fall from standing, initial encounter          DISPOSITION / [address] Admitted 05/17/2022 12:14:48 AM      PATIENT REFERRED TO:  No follow-up provider specified.     DISCHARGE MEDICATIONS:  New Prescriptions    No medications on file       Ino Sandy DO  Emergency Medicine Resident    (Please note that portions of thisnote were completed with a voice recognition program.  Efforts were made to edit the dictations but occasionally words are mis-transcribed.)       Ino Sandy DO  Resident  05/17/22 7517

## 2022-05-17 PROBLEM — S82.832A: Status: ACTIVE | Noted: 2022-05-17

## 2022-05-17 LAB
ANION GAP SERPL CALCULATED.3IONS-SCNC: 14 MMOL/L (ref 9–17)
BUN BLDV-MCNC: 16 MG/DL (ref 8–23)
CHLORIDE BLD-SCNC: 105 MMOL/L (ref 98–107)
CO2: 19 MMOL/L (ref 20–31)
CREAT SERPL-MCNC: 0.69 MG/DL (ref 0.5–0.9)
ETHANOL PERCENT: <0.01 %
ETHANOL: <10 MG/DL
GFR AFRICAN AMERICAN: >60 ML/MIN
GFR NON-AFRICAN AMERICAN: >60 ML/MIN
GFR SERPL CREATININE-BSD FRML MDRD: NORMAL ML/MIN/{1.73_M2}
GLUCOSE BLD-MCNC: 103 MG/DL (ref 70–99)
HCT VFR BLD CALC: 38.9 % (ref 36.3–47.1)
HEMOGLOBIN: 12.4 G/DL (ref 11.9–15.1)
INR BLD: 1.1
MCH RBC QN AUTO: 29.5 PG (ref 25.2–33.5)
MCHC RBC AUTO-ENTMCNC: 31.9 G/DL (ref 28.4–34.8)
MCV RBC AUTO: 92.4 FL (ref 82.6–102.9)
NRBC AUTOMATED: 0 PER 100 WBC
PDW BLD-RTO: 13.3 % (ref 11.8–14.4)
PLATELET # BLD: 194 K/UL (ref 138–453)
PMV BLD AUTO: 11.4 FL (ref 8.1–13.5)
POTASSIUM SERPL-SCNC: 4.8 MMOL/L (ref 3.7–5.3)
PROTHROMBIN TIME: 11.2 SEC (ref 9.1–12.3)
RBC # BLD: 4.21 M/UL (ref 3.95–5.11)
SODIUM BLD-SCNC: 138 MMOL/L (ref 135–144)
WBC # BLD: 9.5 K/UL (ref 3.5–11.3)

## 2022-05-17 PROCEDURE — 6370000000 HC RX 637 (ALT 250 FOR IP): Performed by: STUDENT IN AN ORGANIZED HEALTH CARE EDUCATION/TRAINING PROGRAM

## 2022-05-17 PROCEDURE — 6360000002 HC RX W HCPCS: Performed by: STUDENT IN AN ORGANIZED HEALTH CARE EDUCATION/TRAINING PROGRAM

## 2022-05-17 PROCEDURE — G0378 HOSPITAL OBSERVATION PER HR: HCPCS

## 2022-05-17 PROCEDURE — 96376 TX/PRO/DX INJ SAME DRUG ADON: CPT

## 2022-05-17 PROCEDURE — 99224 PR SBSQ OBSERVATION CARE/DAY 15 MINUTES: CPT | Performed by: ORTHOPAEDIC SURGERY

## 2022-05-17 PROCEDURE — 97166 OT EVAL MOD COMPLEX 45 MIN: CPT

## 2022-05-17 PROCEDURE — 97530 THERAPEUTIC ACTIVITIES: CPT

## 2022-05-17 PROCEDURE — 2580000003 HC RX 258: Performed by: STUDENT IN AN ORGANIZED HEALTH CARE EDUCATION/TRAINING PROGRAM

## 2022-05-17 PROCEDURE — 97162 PT EVAL MOD COMPLEX 30 MIN: CPT

## 2022-05-17 PROCEDURE — 96372 THER/PROPH/DIAG INJ SC/IM: CPT

## 2022-05-17 PROCEDURE — 96375 TX/PRO/DX INJ NEW DRUG ADDON: CPT

## 2022-05-17 PROCEDURE — 97535 SELF CARE MNGMENT TRAINING: CPT

## 2022-05-17 RX ORDER — ONDANSETRON 2 MG/ML
4 INJECTION INTRAMUSCULAR; INTRAVENOUS EVERY 6 HOURS PRN
Status: DISCONTINUED | OUTPATIENT
Start: 2022-05-17 | End: 2022-05-19

## 2022-05-17 RX ORDER — KETOROLAC TROMETHAMINE 15 MG/ML
15 INJECTION, SOLUTION INTRAMUSCULAR; INTRAVENOUS ONCE
Status: COMPLETED | OUTPATIENT
Start: 2022-05-17 | End: 2022-05-17

## 2022-05-17 RX ORDER — ENOXAPARIN SODIUM 100 MG/ML
30 INJECTION SUBCUTANEOUS 2 TIMES DAILY
Status: DISCONTINUED | OUTPATIENT
Start: 2022-05-17 | End: 2022-05-19 | Stop reason: HOSPADM

## 2022-05-17 RX ORDER — METHOCARBAMOL 750 MG/1
750 TABLET, FILM COATED ORAL 3 TIMES DAILY
Status: DISCONTINUED | OUTPATIENT
Start: 2022-05-17 | End: 2022-05-18

## 2022-05-17 RX ORDER — CLOPIDOGREL BISULFATE 75 MG/1
75 TABLET ORAL DAILY
Status: DISCONTINUED | OUTPATIENT
Start: 2022-05-18 | End: 2022-05-19 | Stop reason: HOSPADM

## 2022-05-17 RX ORDER — ACETAMINOPHEN 500 MG
1000 TABLET ORAL EVERY 8 HOURS PRN
Qty: 21 TABLET | Refills: 0 | Status: SHIPPED | OUTPATIENT
Start: 2022-05-17 | End: 2022-05-24

## 2022-05-17 RX ORDER — ALPRAZOLAM 0.5 MG/1
0.5 TABLET ORAL NIGHTLY PRN
Status: DISCONTINUED | OUTPATIENT
Start: 2022-05-18 | End: 2022-05-18

## 2022-05-17 RX ORDER — SODIUM CHLORIDE 0.9 % (FLUSH) 0.9 %
5-40 SYRINGE (ML) INJECTION PRN
Status: DISCONTINUED | OUTPATIENT
Start: 2022-05-17 | End: 2022-05-19 | Stop reason: HOSPADM

## 2022-05-17 RX ORDER — ACETAMINOPHEN 500 MG
1000 TABLET ORAL EVERY 8 HOURS SCHEDULED
Status: DISCONTINUED | OUTPATIENT
Start: 2022-05-17 | End: 2022-05-19 | Stop reason: HOSPADM

## 2022-05-17 RX ORDER — SODIUM CHLORIDE 0.9 % (FLUSH) 0.9 %
5-40 SYRINGE (ML) INJECTION EVERY 12 HOURS SCHEDULED
Status: DISCONTINUED | OUTPATIENT
Start: 2022-05-17 | End: 2022-05-19 | Stop reason: HOSPADM

## 2022-05-17 RX ORDER — HYDROCHLOROTHIAZIDE 25 MG/1
25 TABLET ORAL DAILY
Status: DISCONTINUED | OUTPATIENT
Start: 2022-05-18 | End: 2022-05-19 | Stop reason: HOSPADM

## 2022-05-17 RX ORDER — ONDANSETRON 4 MG/1
4 TABLET, ORALLY DISINTEGRATING ORAL EVERY 8 HOURS PRN
Status: DISCONTINUED | OUTPATIENT
Start: 2022-05-17 | End: 2022-05-19

## 2022-05-17 RX ORDER — UBIDECARENONE 75 MG
50 CAPSULE ORAL DAILY
Status: DISCONTINUED | OUTPATIENT
Start: 2022-05-18 | End: 2022-05-19 | Stop reason: HOSPADM

## 2022-05-17 RX ORDER — OXYCODONE HYDROCHLORIDE 5 MG/1
5 TABLET ORAL ONCE
Status: COMPLETED | OUTPATIENT
Start: 2022-05-17 | End: 2022-05-17

## 2022-05-17 RX ORDER — AMLODIPINE BESYLATE 5 MG/1
5 TABLET ORAL DAILY
Status: DISCONTINUED | OUTPATIENT
Start: 2022-05-18 | End: 2022-05-19 | Stop reason: HOSPADM

## 2022-05-17 RX ORDER — POLYETHYLENE GLYCOL 3350 17 G/17G
17 POWDER, FOR SOLUTION ORAL DAILY
Status: DISCONTINUED | OUTPATIENT
Start: 2022-05-17 | End: 2022-05-19 | Stop reason: HOSPADM

## 2022-05-17 RX ORDER — SODIUM CHLORIDE 9 MG/ML
INJECTION, SOLUTION INTRAVENOUS PRN
Status: DISCONTINUED | OUTPATIENT
Start: 2022-05-17 | End: 2022-05-19

## 2022-05-17 RX ORDER — METHOCARBAMOL 750 MG/1
750 TABLET, FILM COATED ORAL 3 TIMES DAILY
Qty: 21 TABLET | Refills: 0 | Status: SHIPPED | OUTPATIENT
Start: 2022-05-17 | End: 2022-05-19 | Stop reason: HOSPADM

## 2022-05-17 RX ORDER — PRAVASTATIN SODIUM 20 MG
40 TABLET ORAL NIGHTLY
Status: DISCONTINUED | OUTPATIENT
Start: 2022-05-17 | End: 2022-05-19 | Stop reason: HOSPADM

## 2022-05-17 RX ORDER — BUDESONIDE AND FORMOTEROL FUMARATE DIHYDRATE 80; 4.5 UG/1; UG/1
2 AEROSOL RESPIRATORY (INHALATION) 2 TIMES DAILY
Status: DISCONTINUED | OUTPATIENT
Start: 2022-05-17 | End: 2022-05-19 | Stop reason: HOSPADM

## 2022-05-17 RX ORDER — ERGOCALCIFEROL 1.25 MG/1
50000 CAPSULE ORAL WEEKLY
Status: DISCONTINUED | OUTPATIENT
Start: 2022-05-17 | End: 2022-05-17

## 2022-05-17 RX ADMIN — ACETAMINOPHEN 1000 MG: 500 TABLET ORAL at 05:30

## 2022-05-17 RX ADMIN — KETOROLAC TROMETHAMINE 15 MG: 15 INJECTION, SOLUTION INTRAMUSCULAR; INTRAVENOUS at 05:30

## 2022-05-17 RX ADMIN — ENOXAPARIN SODIUM 30 MG: 100 INJECTION SUBCUTANEOUS at 21:07

## 2022-05-17 RX ADMIN — ENOXAPARIN SODIUM 30 MG: 100 INJECTION SUBCUTANEOUS at 08:30

## 2022-05-17 RX ADMIN — ACETAMINOPHEN 1000 MG: 500 TABLET ORAL at 13:29

## 2022-05-17 RX ADMIN — ONDANSETRON 4 MG: 2 INJECTION INTRAMUSCULAR; INTRAVENOUS at 03:17

## 2022-05-17 RX ADMIN — METHOCARBAMOL TABLETS 750 MG: 750 TABLET, COATED ORAL at 06:05

## 2022-05-17 RX ADMIN — METHOCARBAMOL TABLETS 750 MG: 750 TABLET, COATED ORAL at 21:07

## 2022-05-17 RX ADMIN — ACETAMINOPHEN 1000 MG: 500 TABLET ORAL at 21:07

## 2022-05-17 RX ADMIN — SODIUM CHLORIDE, PRESERVATIVE FREE 10 ML: 5 INJECTION INTRAVENOUS at 08:28

## 2022-05-17 RX ADMIN — METHOCARBAMOL TABLETS 750 MG: 750 TABLET, COATED ORAL at 13:30

## 2022-05-17 RX ADMIN — OXYCODONE 5 MG: 5 TABLET ORAL at 03:26

## 2022-05-17 RX ADMIN — ONDANSETRON 4 MG: 4 TABLET, ORALLY DISINTEGRATING ORAL at 09:41

## 2022-05-17 ASSESSMENT — ENCOUNTER SYMPTOMS
ABDOMINAL PAIN: 0
COUGH: 0
SHORTNESS OF BREATH: 0
DIARRHEA: 0
NAUSEA: 0
VOMITING: 0
BACK PAIN: 0

## 2022-05-17 ASSESSMENT — PAIN DESCRIPTION - ORIENTATION: ORIENTATION: LEFT

## 2022-05-17 ASSESSMENT — PAIN SCALES - GENERAL
PAINLEVEL_OUTOF10: 10
PAINLEVEL_OUTOF10: 5
PAINLEVEL_OUTOF10: 8

## 2022-05-17 ASSESSMENT — PAIN DESCRIPTION - LOCATION: LOCATION: ANKLE

## 2022-05-17 NOTE — CARE COORDINATION
SBIRT- completed and screenings were negative            Alcohol Screening and Brief Intervention        Recent Labs     05/16/22 1958   ALC <10       Alcohol Pre-screening     (WOMEN ONLY) How many times in the past year have you had 4 or more drinks in a day?: None    Alcohol Screening Audit       Drug Pre-Screening   How many times in the past year have you used a recreational drug or used a prescription medication for nonmedical reasons?: None    Drug Screening DAST       Mood Pre-Screening (PHQ-2)  During the past two weeks, have you been bothered by little interest or pleasure in doing things?: No  During the past two weeks, have you been bothered by feeling down, depressed, or hopeless?: No    Mood Pre-Screening (PHQ-9)         I have interviewed Marichuy Tracy, [id number] regarding  Her alcohol consumption/drug use and risk for excessive use. Screenings were negative. Patient  N/A intervention at this time.      Deferred []    Completed on: 5/17/2022   JAQUAN VÁZQUEZ

## 2022-05-17 NOTE — ED PROVIDER NOTES
Faculty Sign-Out Attestation  Handoff taken on the following patient from prior Attending Physician: Andrea Sharif    I was available and discussed any additional care issues that arose and coordinated the management plans with the resident(s) caring for the patient during my duty period. Any areas of disagreement with residents documentation of care or procedures are noted on the chart. I was personally present for the key portions of any/all procedures during my duty period. I have documented in the chart those procedures where I was not present during the key portions.     Mechanical fall, ankle fracture, unable to manage ADL /   Possible obs vs full admit    Marie Avila DO  Attending Physician     Marie Avila DO  05/16/22 3761

## 2022-05-17 NOTE — DISCHARGE INSTR - COC
Continuity of Care Form    Patient Name: Charlie Green   :  1940  MRN:  [id number]    Admit date:  2022  Discharge date:  2022    Code Status Order: Full Code   Advance Directives:      Admitting Physician:  Renny Ann MD  PCP: Brady Vann MD    [facility]/Room#: 5083/9263-80  Discharging Unit Phone Number: [phone]    Emergency Contact:   Extended Emergency Contact Information  Primary Emergency Contact: Angel Apple  Address: [address], [place] [address]  Home Phone: [phone]  Relation: Spouse    Past Surgical History:  Past Surgical History:   Procedure Laterality Date    BREAST LUMPECTOMY Right     BREAST LUMPECTOMY      CARDIAC CATHETERIZATION Right     LYMPH NODE DISSECTION Right     right armpit       Immunization History:   Immunization History   Administered Date(s) Administered    COVID-19, Pfizer Purple top, DILUTE for use, 12+ yrs, 30mcg/0.3mL dose 2021, 2021, 2021    Influenza Virus Vaccine 10/19/2012, 10/20/2014    Influenza, High Dose (Fluzone 65 yrs and older) 10/30/2015, 10/19/2016, 2017, 2018, 2019    Influenza, High-dose, Quadv, 65 yrs +, IM (Fluzone) 2020, 2021    Pneumococcal Conjugate 13-valent ([id number]) 10/30/2015    Pneumococcal Polysaccharide (Eczyaxrdi62) 10/19/2016    Tdap (Boostrix, Adacel) 2015       Active Problems:  Patient Active Problem List   Diagnosis Code    Diabetes mellitus, type II ([facility] E11.9    HTN (hypertension) I10    Hypercholesteremia E78.00    Fatigue R53.83    Vitamin B12 deficiency E53.8    Vitamin D deficiency E55.9    Bre[facility] cancer ([facility] C50.919    Depression F32. A    Hydronephrosis N13.30    Anxiety F41.9    [place] K11.6    Fever R50.9    SOB (shortness of breath) R06.02    Traumatic closed nondisplaced fracture of distal fibula, left, initial encounter S82.832A       Isolation/Infection:   Isolation Discharging to [facility]/ [address] [place] [address] [address] [place], [address] [place] [address]       Phone: [phone]       Fax: [phone]              / signature: Electronically signed by Jarred Colunga RN on 5/18/22 at 4:27 PM EDT    PHYSICIAN SECTION    Prognosis: Good    Condition at Discharge: Stable    Rehab Potential (if transferring to Rehab): Good    Recommended Labs or Other Treatments After Discharge: ***    Physician Certification: I certify the above information and transfer of Jerrod Geronimo  is necessary for the continuing treatment of the diagnosis listed and that she requires [address] for less 30 days.      Update Admission H&P: No change in H&P    PHYSICIAN SIGNATURE:  Electronically signed by Shira Crawford MD on 5/17/22 at 11:40 AM EDT

## 2022-05-17 NOTE — PROGRESS NOTES
Trauma Tertiary Survey    Admit Date: 5/16/2022  Hospital day 0    [facility]       Past Medical History:   Diagnosis Date    Allergic rhinitis     pollen    Anxiety     Arthritis     Asthma     Breast cancer ([facility] [facility] 9/10/2014    Breast cancer genetic susceptibility     CAD (coronary artery disease)     Cancer ([facility] [facility]     Deep vein blood clot of right lower extremity ([facility] [facility]     Diabetes mellitus ([facility]     prediabetes    Diabetes mellitus type II 9/10/2014    Fatigue 9/10/2014    HTN (hypertension) 9/10/2014    Hypercholesteremia 9/10/2014    Hyperlipidemia     Hypertension     Psychiatric problem     depression, anxiety    Pulmonary emboli (HCC)     Unspecified cerebral artery occlusion with cerebral infarction     Vitamin B12 deficiency 9/10/2014    Vitamin D deficiency 9/10/2014       Scheduled Meds:   sodium chloride flush  5-40 mL IntraVENous 2 times per day    polyethylene glycol  17 g Oral Daily    acetaminophen  1,000 mg Oral 3 times per day     Continuous Infusions:   sodium chloride       PRN Meds:sodium chloride flush, sodium chloride, ondansetron **OR** ondansetron    Subjective:     Patient has complaints of left leg pain. Pain is moderate, worsens with movement, and some relief by rest.  There is not associated numbness, tingling. Objective:     Patient Vitals for the past 8 hrs:   BP Pulse Resp SpO2   05/17/22 0117 117/65 -- -- 90 %   05/16/22 2255 (!) 157/67 60 20 95 %       No intake/output data recorded. No intake/output data recorded. Radiology:  XR TIBIA FIBULA LEFT (2 VIEWS)    Result Date: 5/16/2022  EXAMINATION: THREE XRAY VIEWS OF THE LEFT ANKLE;   XRAY VIEWS OF THE LEFT TIBIA AND FIBULA 5/16/2022 3:11 pm COMPARISON: None.  HISTORY: ORDERING SYSTEM PROVIDED HISTORY: deformity, concern for fracture TECHNOLOGIST PROVIDED HISTORY: deformity, concern for fracture Reason for Exam: Fall, left ankle swelling and bruising ; ORDERING SYSTEM PROVIDED HISTORY: fall, rule out fracture TECHNOLOGIST PROVIDED HISTORY: fall, rule out fracture FINDINGS: There is an oblique fracture of the distal fibula extending into the lateral malleolus. No significant displacement. Joint spaces are intact. Soft tissue swelling about the ankle. Fracture of the distal fibula extending into the lateral malleolus     XR ANKLE LEFT (MIN 3 VIEWS)    Result Date: 5/16/2022  EXAMINATION: THREE XRAY VIEWS OF THE LEFT ANKLE 5/16/2022 7:55 pm COMPARISON: Same day left ankle radiographs HISTORY: ORDERING SYSTEM PROVIDED HISTORY: Post-Splint TECHNOLOGIST PROVIDED HISTORY: Post-Splint Reason for Exam: Post splint FINDINGS: Redemonstrated Stein type B fracture of the distal left fibula status post splinting. The distal fracture fragment demonstrates mild lateral displacement, similar to prior. No new fractures are identified. The ankle mortise appears intact on these nonweightbearing views. Soft tissue swelling is seen about the ankle. Note that casting material obscures fine bony detail. No significant change in alignment of a Stein type B fracture of the distal left fibula status post splinting. Overlying casting material obscures fine bony detail. XR ANKLE LEFT (MIN 3 VIEWS)    Result Date: 5/16/2022  EXAMINATION: THREE XRAY VIEWS OF THE LEFT ANKLE 5/16/2022 3:53 pm COMPARISON: Earlier exam of same date HISTORY: ORDERING SYSTEM PROVIDED HISTORY: [place] stress view,  AP Stress only TECHNOLOGIST PROVIDED HISTORY: [place] stress view,  AP Stress only Reason for Exam: ortho held stress view FINDINGS: Stress view again shows an oblique fracture of the distal fibula extending into the lateral malleolus. Mild lateral displacement. Joint spaces are intact. Soft tissue swelling about the ankle.      Fracture of the distal fibula extending into the lateral malleolus     XR ANKLE LEFT (MIN 3 VIEWS)    Result Date: 5/16/2022  EXAMINATION: THREE XRAY VIEWS OF THE LEFT ANKLE;   XRAY VIEWS OF THE LEFT TIBIA AND FIBULA 5/16/2022 3:11 pm COMPARISON: None. HISTORY: ORDERING SYSTEM PROVIDED HISTORY: deformity, concern for fracture TECHNOLOGIST PROVIDED HISTORY: deformity, concern for fracture Reason for Exam: Fall, left ankle swelling and bruising ; ORDERING SYSTEM PROVIDED HISTORY: fall, rule out fracture TECHNOLOGIST PROVIDED HISTORY: fall, rule out fracture FINDINGS: There is an oblique fracture of the distal fibula extending into the lateral malleolus. No significant displacement. Joint spaces are intact. Soft tissue swelling about the ankle. Fracture of the distal fibula extending into the lateral malleolus     CT HEAD WO CONTRAST    Result Date: 5/16/2022  EXAMINATION: CT OF THE HEAD WITHOUT CONTRAST  5/16/2022 5:42 pm TECHNIQUE: CT of the head was performed without the administration of intravenous contrast. Automated exposure control, iterative reconstruction, and/or weight based adjustment of the mA/kV was utilized to reduce the radiation dose to as low as reasonably achievable. COMPARISON: 12/19/2012 HISTORY: ORDERING SYSTEM PROVIDED HISTORY: fall, posterior HA, rule out bleed TECHNOLOGIST PROVIDED HISTORY: fall, posterior HA, rule out bleed Decision Support Exception - unselect if not a suspected or confirmed emergency medical condition->Emergency Medical Condition (MA) FINDINGS: BRAIN/VENTRICLES: There is age-appropriate generalized atrophy. There is mild patchy periventricular and subcortical white matter low attenuation that is nonspecific but most consistent with chronic small vessel ischemia. There is no evidence of acute hemorrhage, mass or extra-axial fluid collection. Calcified plaque in the distal vertebral arteries and intracranial internal carotid arteries. ORBITS: The visualized portion of the orbits demonstrate no acute abnormality. SINUSES: The visualized paranasal sinuses and mastoid air cells demonstrate no acute abnormality.  SOFT TISSUES/SKULL:  No acute abnormality of the visualized skull or soft tissues. No acute intracranial abnormality. Generalized atrophy and chronic small vessel ischemic white matter disease. RECOMMENDATIONS: Unavailable     CT CERVICAL SPINE WO CONTRAST    Result Date: 5/16/2022  EXAMINATION: CT OF THE CERVICAL SPINE WITHOUT CONTRAST 5/16/2022 5:42 pm TECHNIQUE: CT of the cervical spine was performed without the administration of intravenous contrast. Multiplanar reformatted images are provided for review. Automated exposure control, iterative reconstruction, and/or weight based adjustment of the mA/kV was utilized to reduce the radiation dose to as low as reasonably achievable. COMPARISON: None. HISTORY: ORDERING SYSTEM PROVIDED HISTORY: fall, rule out traumatic injury TECHNOLOGIST PROVIDED HISTORY: fall, rule out traumatic injury Decision Support Exception - unselect if not a suspected or confirmed emergency medical condition->Emergency Medical Condition (MA) FINDINGS: BONES/ALIGNMENT: There is a minimal degenerative anterolisthesis of C2 on C3 of C3 on C4 and of C4 on C5. Vertebral body alignment is otherwise unremarkable. Cervical vertebral body heights are normal.  Facet joints are normally aligned. There is no acute fracture or traumatic malalignment. DEGENERATIVE CHANGES: There are multilevel degenerative changes in the cervical spine including multilevel degenerative disc disease, facet arthropathy and diffuse cervical spondylosis. SOFT TISSUES: There is no prevertebral soft tissue swelling. Multilevel degenerative changes with no acute fracture or traumatic malalignment. CT CHEST ABDOMEN PELVIS WO CONTRAST    Result Date: 5/16/2022  EXAMINATION: CT OF THE THORACIC SPINE WITHOUT CONTRAST; CT OF THE CHEST, ABDOMEN, AND PELVIS WITHOUT CONTRAST; CT OF THE LUMBAR SPINE WITHOUT CONTRAST  5/16/2022 10:23 pm: TECHNIQUE: CT of the thoracic spine was performed without the administration of intravenous contrast. Multiplanar reformatted images are provided for review. Automated exposure control, iterative reconstruction, and/or weight based adjustment of the mA/kV was utilized to reduce the radiation dose to as low as reasonably achievable.; CT of the chest, abdomen and pelvis was performed without the administration of intravenous contrast. Multiplanar reformatted images are provided for review. Automated exposure control, iterative reconstruction, and/or weight based adjustment of the mA/kV was utilized to reduce the radiation dose to as low as reasonably achievable.; CT of the lumbar spine was performed without the administration of intravenous contrast. Multiplanar reformatted images are provided for review. Adjustment of mA and/or kV according to patient size was utilized. Automated exposure control, iterative reconstruction, and/or weight based adjustment of the mA/kV was utilized to reduce the radiation dose to as low as reasonably achievable. COMPARISON: None. HISTORY: ORDERING SYSTEM PROVIDED HISTORY: Fall TECHNOLOGIST PROVIDED HISTORY: Fall Reason for Exam: Fall FINDINGS: Thoracic: BONES/ALIGNMENT: There is no acute fracture or traumatic malalignment. Mild dextroscoliosis of midthoracic spine. DEGENERATIVE CHANGES: No high-grade canal or foraminal stenosis. SOFT TISSUES: There is no prevertebral soft tissue swelling. Lumbar: BONES/ALIGNMENT: There is no acute fracture or traumatic malalignment. Moderate levoscoliosis with tip L2-L3. DEGENERATIVE CHANGES: Changes related to instrumented posterior fusion and disc space fusion of L3-L4 and L4-L5. Bilateral laminectomy defects. No high-grade canal stenosis. Multilevel neural foraminal narrowing with severe right-sided neural foraminal narrowing at L1-L2. SOFT TISSUES: There is no prevertebral soft tissue swelling. CT chest: Lines and tubes:  None. Lungs and Airways and Pleura:  Central airways are patent. No lung consolidation. No pleural effusion. No pneumothorax.  Lymph nodes: No pathologically enlarged mediastinal, hilar, lower cervical, or chest wall lymph nodes. Cardiovascular and Mediastinum: No acute aortic pathology. Coronary arterial calcification. Cardiac chamber sizes appear to measure within normal limits on this non ECG gated study. No pericardial effusion. The thyroid gland is unremarkable. The esophagus is unremarkable. Bones/Soft tissues: No fracture. No definite suspicious lytic or blastic foci. Extensive abnormalities within the right breast with multiple nodular soft tissues with foci of calcification. CT abdomen and pelvis: Organs: The liver, spleen, adrenal glands, gallbladder, pancreas, kidneys and ureters and pelvic organs including the urinary bladder appear unremarkable. Peritoneum / Retroperitoneum:  No free air or free fluid is noted. No pathologically enlarged lymphadenopathy. The vasculature do not demonstrate acute abnormality. GI Tract:  No distention or wall thickening. Bones and Soft Tissues: No fracture. No concerning lytic or sclerotic lesions are noted. Fat containing para umbilical hernia. CT of thoracic spine: No acute osseous abnormality. No acute fracture. CT of lumbar spine: No acute osseous abnormality. No acute fracture. Changes related to instrumented disc space and posterior fusion and posterior decompression at L3-L4 and L4-L5. CT of chest abdomen and pelvis: No acute traumatic injury within the chest abdomen and pelvis. Extensive abnormalities within the right breast with multiple nodular soft tissues with foci of calcification. Extensive abnormalities within the right breast with multiple nodular soft tissues with foci of calcification. Mammographic correlation is recommended.      CT LUMBAR SPINE TRAUMA RECONSTRUCTION    Result Date: 5/16/2022  EXAMINATION: CT OF THE THORACIC SPINE WITHOUT CONTRAST; CT OF THE CHEST, ABDOMEN, AND PELVIS WITHOUT CONTRAST; CT OF THE LUMBAR SPINE WITHOUT CONTRAST  5/16/2022 10:23 pm: TECHNIQUE: CT of the thoracic spine was performed without the administration of intravenous contrast. Multiplanar reformatted images are provided for review. Automated exposure control, iterative reconstruction, and/or weight based adjustment of the mA/kV was utilized to reduce the radiation dose to as low as reasonably achievable.; CT of the chest, abdomen and pelvis was performed without the administration of intravenous contrast. Multiplanar reformatted images are provided for review. Automated exposure control, iterative reconstruction, and/or weight based adjustment of the mA/kV was utilized to reduce the radiation dose to as low as reasonably achievable.; CT of the lumbar spine was performed without the administration of intravenous contrast. Multiplanar reformatted images are provided for review. Adjustment of mA and/or kV according to patient size was utilized. Automated exposure control, iterative reconstruction, and/or weight based adjustment of the mA/kV was utilized to reduce the radiation dose to as low as reasonably achievable. COMPARISON: None. HISTORY: ORDERING SYSTEM PROVIDED HISTORY: Fall TECHNOLOGIST PROVIDED HISTORY: Fall Reason for Exam: Fall FINDINGS: Thoracic: BONES/ALIGNMENT: There is no acute fracture or traumatic malalignment. Mild dextroscoliosis of midthoracic spine. DEGENERATIVE CHANGES: No high-grade canal or foraminal stenosis. SOFT TISSUES: There is no prevertebral soft tissue swelling. Lumbar: BONES/ALIGNMENT: There is no acute fracture or traumatic malalignment. Moderate levoscoliosis with tip L2-L3. DEGENERATIVE CHANGES: Changes related to instrumented posterior fusion and disc space fusion of L3-L4 and L4-L5. Bilateral laminectomy defects. No high-grade canal stenosis. Multilevel neural foraminal narrowing with severe right-sided neural foraminal narrowing at L1-L2. SOFT TISSUES: There is no prevertebral soft tissue swelling. CT chest: Lines and tubes:  None. Lungs and Airways and Pleura:  Central airways are patent.  No lung consolidation. No pleural effusion. No pneumothorax. Lymph nodes: No pathologically enlarged mediastinal, hilar, lower cervical, or chest wall lymph nodes. Cardiovascular and Mediastinum: No acute aortic pathology. Coronary arterial calcification. Cardiac chamber sizes appear to measure within normal limits on this non ECG gated study. No pericardial effusion. The thyroid gland is unremarkable. The esophagus is unremarkable. Bones/Soft tissues: No fracture. No definite suspicious lytic or blastic foci. Extensive abnormalities within the right breast with multiple nodular soft tissues with foci of calcification. CT abdomen and pelvis: Organs: The liver, spleen, adrenal glands, gallbladder, pancreas, kidneys and ureters and pelvic organs including the urinary bladder appear unremarkable. Peritoneum / Retroperitoneum:  No free air or free fluid is noted. No pathologically enlarged lymphadenopathy. The vasculature do not demonstrate acute abnormality. GI Tract:  No distention or wall thickening. Bones and Soft Tissues: No fracture. No concerning lytic or sclerotic lesions are noted. Fat containing para umbilical hernia. CT of thoracic spine: No acute osseous abnormality. No acute fracture. CT of lumbar spine: No acute osseous abnormality. No acute fracture. Changes related to instrumented disc space and posterior fusion and posterior decompression at L3-L4 and L4-L5. CT of chest abdomen and pelvis: No acute traumatic injury within the chest abdomen and pelvis. Extensive abnormalities within the right breast with multiple nodular soft tissues with foci of calcification. Extensive abnormalities within the right breast with multiple nodular soft tissues with foci of calcification. Mammographic correlation is recommended.      CT THORACIC SPINE TRAUMA RECONSTRUCTION    Result Date: 5/16/2022  EXAMINATION: CT OF THE THORACIC SPINE WITHOUT CONTRAST; CT OF THE CHEST, ABDOMEN, AND PELVIS WITHOUT CONTRAST; CT OF THE LUMBAR SPINE WITHOUT CONTRAST  5/16/2022 10:23 pm: TECHNIQUE: CT of the thoracic spine was performed without the administration of intravenous contrast. Multiplanar reformatted images are provided for review. Automated exposure control, iterative reconstruction, and/or weight based adjustment of the mA/kV was utilized to reduce the radiation dose to as low as reasonably achievable.; CT of the chest, abdomen and pelvis was performed without the administration of intravenous contrast. Multiplanar reformatted images are provided for review. Automated exposure control, iterative reconstruction, and/or weight based adjustment of the mA/kV was utilized to reduce the radiation dose to as low as reasonably achievable.; CT of the lumbar spine was performed without the administration of intravenous contrast. Multiplanar reformatted images are provided for review. Adjustment of mA and/or kV according to patient size was utilized. Automated exposure control, iterative reconstruction, and/or weight based adjustment of the mA/kV was utilized to reduce the radiation dose to as low as reasonably achievable. COMPARISON: None. HISTORY: ORDERING SYSTEM PROVIDED HISTORY: Fall TECHNOLOGIST PROVIDED HISTORY: Fall Reason for Exam: Fall FINDINGS: Thoracic: BONES/ALIGNMENT: There is no acute fracture or traumatic malalignment. Mild dextroscoliosis of midthoracic spine. DEGENERATIVE CHANGES: No high-grade canal or foraminal stenosis. SOFT TISSUES: There is no prevertebral soft tissue swelling. Lumbar: BONES/ALIGNMENT: There is no acute fracture or traumatic malalignment. Moderate levoscoliosis with tip L2-L3. DEGENERATIVE CHANGES: Changes related to instrumented posterior fusion and disc space fusion of L3-L4 and L4-L5. Bilateral laminectomy defects. No high-grade canal stenosis. Multilevel neural foraminal narrowing with severe right-sided neural foraminal narrowing at L1-L2.  SOFT TISSUES: There is no prevertebral soft tissue swelling. CT chest: Lines and tubes:  None. Lungs and Airways and Pleura:  Central airways are patent. No lung consolidation. No pleural effusion. No pneumothorax. Lymph nodes: No pathologically enlarged mediastinal, hilar, lower cervical, or chest wall lymph nodes. Cardiovascular and Mediastinum: No acute aortic pathology. Coronary arterial calcification. Cardiac chamber sizes appear to measure within normal limits on this non ECG gated study. No pericardial effusion. The thyroid gland is unremarkable. The esophagus is unremarkable. Bones/Soft tissues: No fracture. No definite suspicious lytic or blastic foci. Extensive abnormalities within the right breast with multiple nodular soft tissues with foci of calcification. CT abdomen and pelvis: Organs: The liver, spleen, adrenal glands, gallbladder, pancreas, kidneys and ureters and pelvic organs including the urinary bladder appear unremarkable. Peritoneum / Retroperitoneum:  No free air or free fluid is noted. No pathologically enlarged lymphadenopathy. The vasculature do not demonstrate acute abnormality. GI Tract:  No distention or wall thickening. Bones and Soft Tissues: No fracture. No concerning lytic or sclerotic lesions are noted. Fat containing para umbilical hernia. CT of thoracic spine: No acute osseous abnormality. No acute fracture. CT of lumbar spine: No acute osseous abnormality. No acute fracture. Changes related to instrumented disc space and posterior fusion and posterior decompression at L3-L4 and L4-L5. CT of chest abdomen and pelvis: No acute traumatic injury within the chest abdomen and pelvis. Extensive abnormalities within the right breast with multiple nodular soft tissues with foci of calcification. Extensive abnormalities within the right breast with multiple nodular soft tissues with foci of calcification. Mammographic correlation is recommended.        PHYSICAL EXAM:   GCS:  4 - Opens eyes on own   6 - Follows simple motor commands  5 - Alert and oriented    Pupil size:  Left 4 mm Right 4 mm  Pupil reaction: Yes  Wiggles fingers: Left Yes Right Yes  Hand grasp:   Left normal   Right normal  Wiggles toes: Left Yes    Right Yes  Plantar flexion: Left decreased  Right normal    /65   Pulse 60   Temp 98.6 °F (37 °C) (Oral)   Resp 20   SpO2 90%   General appearance: alert and cooperative  Head: Normocephalic, without obvious abnormality, atraumatic  Neck: supple, symmetrical, trachea midline  Back: symmetric, no curvature. ROM normal. No CVA tenderness. Lungs: clear to auscultation bilaterally  Heart: regular rate and rhythm, S1, S2 normal, no murmur, click, rub or gallop  Abdomen: soft, non-tender; bowel sounds normal; no masses,  no organomegaly  Extremities: LLE splinted. 2+ pulses.  Neurovascularly intact  Pulses: 2+ and symmetric    Spine:     Spine Tenderness ROM   Cervical 0 /10 Normal   Thoracic 0 /10 Normal   Lumbar 0 /10 Normal     Musculoskeletal    Joint Tenderness Swelling ROM   Right shoulder absent absent normal   Left shoulder absent absent normal   Right elbow absent absent normal   Left elbow absent absent normal   Right wrist absent absent normal   Left wrist absent absent normal   Right hand grasp absent absent normal   Left hand grasp absent absent normal   Right hip absent absent normal   Left hip absent absent normal   Right knee absent absent normal   Left knee absent absent Abnormal - limited due to left fibular fx and splint   Right ankle absent absent normal   Left ankle absent absent Abnormal - limited due to left fibular fx and splint   Right foot absent absent normal   Left foot absent absent Abnormal - limited due to left fibular fx and splint             CONSULTS: Orthopedic surgery      INJURIES:        Patient Active Problem List   Diagnosis    Diabetes mellitus, type II ([address]    HTN (hypertension)    Hypercholesteremia    Fatigue    Vitamin B12 deficiency    Vitamin D deficiency    [facility] ([facility]    Depression    Hydronephrosis    Anxiety    Ranula    Fever    SOB (shortness of breath)    Traumatic closed nondisplaced fracture of distal fibula, left, initial encounter         Assessment/Plan:     Please refer to most recent trauma surgery progress note        Gómez Ozuna MD  5/17/22, 4:12 AM

## 2022-05-17 NOTE — PROGRESS NOTES
Speech Language Pathology  [facility]  Speech Language Pathology    SPEECH/COGNITIVE ASSESSMENT    NO LOC,[facility] OR CVA/TIA - ST TO DEFER AT THIS TIME      Date: 5/17/2022  Patient Name: James Meyer  YOB: 1940   AGE: 79 y.o. MRN: [id number]        PT NOT SEEN FOR SPEECH OR COGNITIVE ASSESSMENT AT THIS TIME AS NO LOC, [facility] OR CVA/TIA IS DOCUMENTED. ST TO DEFER AT THIS TIME. PLEASE RE-COSULT AS NEEDED.       [address], SLP  5/17/2022  6:58 AM

## 2022-05-17 NOTE — CARE COORDINATION
Transitional Plannin: Patient's  at bedside. She already started working with Therapy and realized she needs more assistance. Patient would like to go to A SNF. She chose [place]. Referral sent. 1503: Patient cannot go to [facility] due to a billing issue.  Sent Referrals to [facility], [address] [place] [address] for review

## 2022-05-17 NOTE — PROGRESS NOTES
Occupational Therapy  Facility/Department: [facility]  Occupational Therapy Initial Assessment    Name: Alexus Fink  : 1940  MRN: [id number]  Date of Service: 2022  Chief Complaint   Patient presents with    Ankle Pain    Fall       Discharge Recommendations: Equipment recommendations listed below are based on what the patient would need if they were able to return to prior living arrangements at the time of discharge. Patient would benefit from continued therapy after discharge  OT Equipment Recommendations  Equipment Needed: Yes  Mobility Devices: Charna Barb; ADL Assistive Devices  Walker: Rolling (pt has a 4ww yet RW is safer for stand>pivot transfers as LLE NWB)  ADL Assistive Devices: Transfer Tub Bench;Sock-Aid Hard;Reacher;Long-handled Sponge;Long-handled Shoe Horn;Toileting - Standard Commode       Patient Diagnosis(es): The primary encounter diagnosis was Closed fracture of distal end of left fibula, unspecified fracture morphology, initial encounter. A diagnosis of Fall from standing, initial encounter was also pertinent to this visit. Past Medical History:  has a past medical history of Allergic rhinitis, Anxiety, Arthritis, Asthma, Breast cancer ([address] Breast cancer genetic susceptibility, CAD (coronary artery disease), Cancer ([address] Deep vein blood clot of right lower extremity ([address] Diabetes mellitus ([address] Diabetes mellitus type II, Fatigue, HTN (hypertension), Hypercholesteremia, Hyperlipidemia, Hypertension, Psychiatric problem, Pulmonary emboli ([address] Unspecified cerebral artery occlusion with cerebral infarction, Vitamin B12 deficiency, and Vitamin D deficiency. Past Surgical History:  has a past surgical history that includes Cardiac catheterization (Right); Breast lumpectomy (Right); Breast lumpectomy; and lymph node dissection (Right). Assessment   Performance deficits / Impairments: Decreased ADL status; Decreased functional mobility ; Decreased safe except when going to store       Objective   SpO2: 92 %  O2 Device: None (Room air)  Vision Exceptions: Wears glasses at all times  Hearing: Within functional limits          Safety Devices  Type of Devices: Patient at risk for falls;Call light within reach; Left in chair;Nurse notified;Gait belt; Chair alarm in place  Restraints  Restraints Initially in Place: No  Bed Mobility Training  Bed Mobility Training: Yes  Supine to Sit: Minimum assistance  Sit to Supine: Other (comment) (Pt retired sitting in recliner with legs elevated and alarm on at exit)  Scooting: Supervision  Transfer Training  Transfer Training: Yes  Sit to Stand: Minimum assistance  Stand to Sit: Maximum assistance (First transfer back onto bed required a min A, however when transferring to recliner and sitting down pt required max A for safety and not miss chair, for both transfers pt forgetting to reach back with UE despite cues)  Stand Pivot Transfers: Maximum assistance (RW used, adaptive stand>pivot as pt did not hop on RLE but wiggled R foot and shifted biody position to sit in recliner, difficult to maintain NWB to LLE)     AROM: Within functional limits  PROM: Within functional limits  Strength: Within functional limits  Coordination: Within functional limits  Tone: Normal  Sensation: Intact  ADL  Feeding: Setup;Modified independent   Grooming: Setup;Modified independent   UE Bathing: Setup; Increased time to complete;Stand by assistance  LE Bathing: Setup; Increased time to complete;Minimal assistance  UE Dressing: Setup; Increased time to complete;Contact guard assistance  LE Dressing: Setup; Increased time to complete; Moderate assistance  Toileting: Setup; Increased time to complete;Maximum assistance  Additional Comments: Pt sitting on EOB and writer educated pt verbally on benefits of tub bench/reacher/sock-aid during ADL tasks, pt able to don R sock with fair figure-4 technique while sitting, pt will require max A transferring to toilet and managing LB clothing                 Cognition  Overall Cognitive Status: Exceptions  Safety Judgement: Decreased awareness of need for assistance;Decreased awareness of need for safety  Insights: Decreased awareness of deficits  Cognition Comment: Overall pt demo's good cognition, however, pt lacks full insight into deficits and assist levels, 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 lacking medical knowledge   Orientation: [facility]                  Education Given To: Patient; Family  Education Provided: Role of Therapy;Plan of Care;ADL Adaptive Strategies;Transfer Training;Family Education;Precautions; Equipment  Education Method: Demonstration;Verbal  Barriers to Learning: Vision;Cognition  Education Outcome: Verbalized understanding;Continued education needed  LUE AROM (degrees)  LUE AROM : WFL  RUE AROM (degrees)  RUE AROM : WFL                     AM-PAC Score        AM-PAC Inpatient Daily Activity Raw Score: 17 (05/17/22 1039)  AM-PAC Inpatient ADL T-Scale Score : 37.26 (05/17/22 1039)  ADL Inpatient CMS 0-100% Score: 50.11 (05/17/22 1039)  ADL Inpatient CMS G-Code Modifier : CK (05/17/22 1039)    Goals  Short Term Goals  Time Frame for Short term goals: Pt will by discharge  Short Term Goal 1: demo good safety awareness during func mob around room in w/c while avoiding all obstacles, SUP  Short Term Goal 2: demo stand>pivot transfer from EOB to chair or w/c using RW and CGA, while maintaining NWB to LLE  Short Term Goal 3: identify 2 non-pharmacological pain-relieving techniques with 1 vc  Short Term Goal 4: demo ADL UB bathing/dressing activity at mod I with setup only  Short Term Goal 5: demo ADL LB bathing/dressing activity at [facility], reacher/sock-aid PRN       Therapy Time   Individual Concurrent Group Co-treatment   Time In 0920         Time Out 1000         Minutes 40         Timed Code Treatment Minutes: 12 Minutes       DIOGO Giron/L

## 2022-05-17 NOTE — PLAN OF CARE
Problem: Discharge Planning  Goal: Discharge to home or other facility with appropriate resources  Outcome: Progressing  Flowsheets (Taken 5/17/2022 0423)  Discharge to home or other facility with appropriate resources: Identify barriers to discharge with patient and caregiver     Problem: Pain  Goal: Verbalizes/displays adequate comfort level or baseline comfort level  Outcome: Progressing     Problem: Skin/Tissue Integrity  Goal: Absence of new skin breakdown  Description: 1. Monitor for areas of redness and/or skin breakdown  2. Assess vascular access sites hourly  3. Every 4-6 hours minimum:  Change oxygen saturation probe site  4. Every 4-6 hours:  If on nasal continuous positive airway pressure, respiratory therapy assess nares and determine need for appliance change or resting period.   Outcome: Progressing     Problem: Safety - Adult  Goal: Free from fall injury  Outcome: Progressing     Problem: ABCDS Injury Assessment  Goal: Absence of physical injury  Outcome: Progressing

## 2022-05-17 NOTE — PROGRESS NOTES
PROGRESS NOTE      [id number]  DATE: 2022  SURGEON: Jb Sellers  PRIMARY CARE PHYSICIAN: Daphne Arguelles MD    HD: # 0    ASSESSMENT    Patient Active Problem List   Diagnosis    Diabetes mellitus, type II ([facility] [address]    HTN (hypertension)    Hypercholesteremia    Fatigue    Vitamin B12 deficiency    Vitamin D deficiency    Breast cancer ([facility] [address]    Depression    Hydronephrosis    Anxiety    Ranula    Fever    SOB (shortness of breath)    Traumatic closed nondisplaced fracture of distal fibula, left, initial encounter       MEDICAL DECISION MAKING AND PLAN    80 yo F, fall up 1 step. No LOC. On ASA and plavix  -Left distal fibula fracture extending into lateral malleolus  -CT head and C spine negative  -Orthopedic surgery consult  -LLE splinted   -Nonweightbearing to left lower extremity  -Multimodal pain therapy  -PT/OT  -Notify patient of incidental breast findings, will need PCP f/u  Dispo: pending PT/OT evaluation      SUBJECTIVE    Sarah Wilder was examined at bedside. No acute events since admission. Patient states that she had some pain this morning however after receiving her Tylenol this went away. Denies any numbness or tingling in the lower extremities. Is tolerating p.o. diet denies any chest pain shortness of breath, fever, chills, nausea, vomiting. Patient states at home she has 1 step to get in her house but otherwise is a 1 level house. Lives at home with her  and her son lives 2 houses down.       OBJECTIVE  VITALS: Temp: Temp: 97.9 °F (36.6 °C)Temp  Av.2 °F (36.8 °C)  Min: 97.9 °F (36.6 °C)  Max: 98.6 °F (37 °C) BP Systolic (76RTP), DCJ:965 , Min:117 , VRS:243   Diastolic (49CAD), HAK:69, Min:55, Max:76   Pulse Pulse  Av.8  Min: 53  Max: 65 Resp Resp  Av.7  Min: 12  Max: 20 Pulse ox SpO2  Av %  Min: 90 %  Max: 97 %  GENERAL: No acute distress, nontoxic-appearing speaking full sentences  NEURO: CNII-XII grossly intact, no focal neurological deficits    HEENT: Trachea midline, no JVD, normocephalic, atraumatic  LUNGS: Speaking full sentences, equal chest rise and fall, no accessory muscle usage  HEART: Regular rate and rhythm  ABDOMEN: Soft, nontender, no rebound or guarding  EXTREMITY: Moves all extremities equally, splint to left lower leg, sensation cap refill intact in bilateral upper and lower extremities    No intake/output data recorded. Drain/tube output:  No intake/output data recorded. LAB:  CBC:   Recent Labs     05/16/22 1958   WBC 9.5   HGB 12.4   HCT 38.9   MCV 92.4        BMP:   Recent Labs     05/16/22 1958      K 4.8      CO2 19*   BUN 16   CREATININE 0.69   GLUCOSE 103*     COAGS:   Recent Labs     05/16/22 1958   INR 1.1       RADIOLOGY:  XR TIBIA FIBULA LEFT (2 VIEWS)    Result Date: 5/16/2022  EXAMINATION: THREE XRAY VIEWS OF THE LEFT ANKLE;   XRAY VIEWS OF THE LEFT TIBIA AND FIBULA 5/16/2022 3:11 pm COMPARISON: None. HISTORY: ORDERING SYSTEM PROVIDED HISTORY: deformity, concern for fracture TECHNOLOGIST PROVIDED HISTORY: deformity, concern for fracture Reason for Exam: Fall, left ankle swelling and bruising ; ORDERING SYSTEM PROVIDED HISTORY: fall, rule out fracture TECHNOLOGIST PROVIDED HISTORY: fall, rule out fracture FINDINGS: There is an oblique fracture of the distal fibula extending into the lateral malleolus. No significant displacement. Joint spaces are intact. Soft tissue swelling about the ankle. Fracture of the distal fibula extending into the lateral malleolus     XR ANKLE LEFT (MIN 3 VIEWS)    Result Date: 5/16/2022  EXAMINATION: THREE XRAY VIEWS OF THE LEFT ANKLE 5/16/2022 7:55 pm COMPARISON: Same day left ankle radiographs HISTORY: ORDERING SYSTEM PROVIDED HISTORY: Post-Splint TECHNOLOGIST PROVIDED HISTORY: Post-Splint Reason for Exam: Post splint FINDINGS: Redemonstrated Stein type B fracture of the distal left fibula status post splinting.   The distal fracture fragment demonstrates mild lateral displacement, similar to prior. No new fractures are identified. The ankle mortise appears intact on these nonweightbearing views. Soft tissue swelling is seen about the ankle. Note that casting material obscures fine bony detail. No significant change in alignment of a Stein type B fracture of the distal left fibula status post splinting. Overlying casting material obscures fine bony detail. XR ANKLE LEFT (MIN 3 VIEWS)    Result Date: 5/16/2022  EXAMINATION: THREE XRAY VIEWS OF THE LEFT ANKLE 5/16/2022 3:53 pm COMPARISON: Earlier exam of same date HISTORY: ORDERING SYSTEM PROVIDED HISTORY: [place]ward stress view,  AP Stress only TECHNOLOGIST PROVIDED HISTORY: [place] stress view,  AP Stress only Reason for Exam: ortho held stress view FINDINGS: Stress view again shows an oblique fracture of the distal fibula extending into the lateral malleolus. Mild lateral displacement. Joint spaces are intact. Soft tissue swelling about the ankle. Fracture of the distal fibula extending into the lateral malleolus     XR ANKLE LEFT (MIN 3 VIEWS)    Result Date: 5/16/2022  EXAMINATION: THREE XRAY VIEWS OF THE LEFT ANKLE;   XRAY VIEWS OF THE LEFT TIBIA AND FIBULA 5/16/2022 3:11 pm COMPARISON: None. HISTORY: ORDERING SYSTEM PROVIDED HISTORY: deformity, concern for fracture TECHNOLOGIST PROVIDED HISTORY: deformity, concern for fracture Reason for Exam: Fall, left ankle swelling and bruising ; ORDERING SYSTEM PROVIDED HISTORY: fall, rule out fracture TECHNOLOGIST PROVIDED HISTORY: fall, rule out fracture FINDINGS: There is an oblique fracture of the distal fibula extending into the lateral malleolus. No significant displacement. Joint spaces are intact. Soft tissue swelling about the ankle.      Fracture of the distal fibula extending into the lateral malleolus     CT HEAD WO CONTRAST    Result Date: 5/16/2022  EXAMINATION: CT OF THE HEAD WITHOUT CONTRAST  5/16/2022 5:42 pm TECHNIQUE: CT of the head was performed without the administration of intravenous contrast. Automated exposure control, iterative reconstruction, and/or weight based adjustment of the mA/kV was utilized to reduce the radiation dose to as low as reasonably achievable. COMPARISON: 12/19/2012 HISTORY: ORDERING SYSTEM PROVIDED HISTORY: fall, posterior HA, rule out bleed TECHNOLOGIST PROVIDED HISTORY: fall, posterior HA, rule out bleed Decision Support Exception - unselect if not a suspected or confirmed emergency medical condition->Emergency Medical Condition (MA) FINDINGS: BRAIN/VENTRICLES: There is age-appropriate generalized atrophy. There is mild patchy periventricular and subcortical white matter low attenuation that is nonspecific but most consistent with chronic small vessel ischemia. There is no evidence of acute hemorrhage, mass or extra-axial fluid collection. Calcified plaque in the distal vertebral arteries and intracranial internal carotid arteries. ORBITS: The visualized portion of the orbits demonstrate no acute abnormality. SINUSES: The visualized paranasal sinuses and mastoid air cells demonstrate no acute abnormality. SOFT TISSUES/SKULL:  No acute abnormality of the visualized skull or soft tissues. No acute intracranial abnormality. Generalized atrophy and chronic small vessel ischemic white matter disease. RECOMMENDATIONS: Unavailable     CT CERVICAL SPINE WO CONTRAST    Result Date: 5/16/2022  EXAMINATION: CT OF THE CERVICAL SPINE WITHOUT CONTRAST 5/16/2022 5:42 pm TECHNIQUE: CT of the cervical spine was performed without the administration of intravenous contrast. Multiplanar reformatted images are provided for review. Automated exposure control, iterative reconstruction, and/or weight based adjustment of the mA/kV was utilized to reduce the radiation dose to as low as reasonably achievable. COMPARISON: None.  HISTORY: ORDERING SYSTEM PROVIDED HISTORY: fall, rule out traumatic injury utilized. Automated exposure control, iterative reconstruction, and/or weight based adjustment of the mA/kV was utilized to reduce the radiation dose to as low as reasonably achievable. COMPARISON: None. HISTORY: ORDERING SYSTEM PROVIDED HISTORY: Fall TECHNOLOGIST PROVIDED HISTORY: Fall Reason for Exam: Fall FINDINGS: Thoracic: BONES/ALIGNMENT: There is no acute fracture or traumatic malalignment. Mild dextroscoliosis of midthoracic spine. DEGENERATIVE CHANGES: No high-grade canal or foraminal stenosis. SOFT TISSUES: There is no prevertebral soft tissue swelling. Lumbar: BONES/ALIGNMENT: There is no acute fracture or traumatic malalignment. Moderate levoscoliosis with tip L2-L3. DEGENERATIVE CHANGES: Changes related to instrumented posterior fusion and disc space fusion of L3-L4 and L4-L5. Bilateral laminectomy defects. No high-grade canal stenosis. Multilevel neural foraminal narrowing with severe right-sided neural foraminal narrowing at L1-L2. SOFT TISSUES: There is no prevertebral soft tissue swelling. CT chest: Lines and tubes:  None. Lungs and Airways and Pleura:  Central airways are patent. No lung consolidation. No pleural effusion. No pneumothorax. Lymph nodes: No pathologically enlarged mediastinal, hilar, lower cervical, or chest wall lymph nodes. Cardiovascular and Mediastinum: No acute aortic pathology. Coronary arterial calcification. Cardiac chamber sizes appear to measure within normal limits on this non ECG gated study. No pericardial effusion. The thyroid gland is unremarkable. The esophagus is unremarkable. Bones/Soft tissues: No fracture. No definite suspicious lytic or blastic foci. Extensive abnormalities within the right breast with multiple nodular soft tissues with foci of calcification. CT abdomen and pelvis: Organs: The liver, spleen, adrenal glands, gallbladder, pancreas, kidneys and ureters and pelvic organs including the urinary bladder appear unremarkable.  Peritoneum / Retroperitoneum:  No free air or free fluid is noted. No pathologically enlarged lymphadenopathy. The vasculature do not demonstrate acute abnormality. GI Tract:  No distention or wall thickening. Bones and Soft Tissues: No fracture. No concerning lytic or sclerotic lesions are noted. Fat containing para umbilical hernia. CT of thoracic spine: No acute osseous abnormality. No acute fracture. CT of lumbar spine: No acute osseous abnormality. No acute fracture. Changes related to instrumented disc space and posterior fusion and posterior decompression at L3-L4 and L4-L5. CT of chest abdomen and pelvis: No acute traumatic injury within the chest abdomen and pelvis. Extensive abnormalities within the right breast with multiple nodular soft tissues with foci of calcification. Extensive abnormalities within the right breast with multiple nodular soft tissues with foci of calcification. Mammographic correlation is recommended. CT LUMBAR SPINE TRAUMA RECONSTRUCTION    Result Date: 5/16/2022  EXAMINATION: CT OF THE THORACIC SPINE WITHOUT CONTRAST; CT OF THE CHEST, ABDOMEN, AND PELVIS WITHOUT CONTRAST; CT OF THE LUMBAR SPINE WITHOUT CONTRAST  5/16/2022 10:23 pm: TECHNIQUE: CT of the thoracic spine was performed without the administration of intravenous contrast. Multiplanar reformatted images are provided for review. Automated exposure control, iterative reconstruction, and/or weight based adjustment of the mA/kV was utilized to reduce the radiation dose to as low as reasonably achievable.; CT of the chest, abdomen and pelvis was performed without the administration of intravenous contrast. Multiplanar reformatted images are provided for review.  Automated exposure control, iterative reconstruction, and/or weight based adjustment of the mA/kV was utilized to reduce the radiation dose to as low as reasonably achievable.; CT of the lumbar spine was performed without the administration of intravenous contrast. Multiplanar reformatted images are provided for review. Adjustment of mA and/or kV according to patient size was utilized. Automated exposure control, iterative reconstruction, and/or weight based adjustment of the mA/kV was utilized to reduce the radiation dose to as low as reasonably achievable. COMPARISON: None. HISTORY: ORDERING SYSTEM PROVIDED HISTORY: Fall TECHNOLOGIST PROVIDED HISTORY: Fall Reason for Exam: Fall FINDINGS: Thoracic: BONES/ALIGNMENT: There is no acute fracture or traumatic malalignment. Mild dextroscoliosis of midthoracic spine. DEGENERATIVE CHANGES: No high-grade canal or foraminal stenosis. SOFT TISSUES: There is no prevertebral soft tissue swelling. Lumbar: BONES/ALIGNMENT: There is no acute fracture or traumatic malalignment. Moderate levoscoliosis with tip L2-L3. DEGENERATIVE CHANGES: Changes related to instrumented posterior fusion and disc space fusion of L3-L4 and L4-L5. Bilateral laminectomy defects. No high-grade canal stenosis. Multilevel neural foraminal narrowing with severe right-sided neural foraminal narrowing at L1-L2. SOFT TISSUES: There is no prevertebral soft tissue swelling. CT chest: Lines and tubes:  None. Lungs and Airways and Pleura:  Central airways are patent. No lung consolidation. No pleural effusion. No pneumothorax. Lymph nodes: No pathologically enlarged mediastinal, hilar, lower cervical, or chest wall lymph nodes. Cardiovascular and Mediastinum: No acute aortic pathology. Coronary arterial calcification. Cardiac chamber sizes appear to measure within normal limits on this non ECG gated study. No pericardial effusion. The thyroid gland is unremarkable. The esophagus is unremarkable. Bones/Soft tissues: No fracture. No definite suspicious lytic or blastic foci. Extensive abnormalities within the right breast with multiple nodular soft tissues with foci of calcification. CT abdomen and pelvis: Organs:  The liver, spleen, adrenal glands, gallbladder, pancreas, kidneys and ureters and pelvic organs including the urinary bladder appear unremarkable. Peritoneum / Retroperitoneum:  No free air or free fluid is noted. No pathologically enlarged lymphadenopathy. The vasculature do not demonstrate acute abnormality. GI Tract:  No distention or wall thickening. Bones and Soft Tissues: No fracture. No concerning lytic or sclerotic lesions are noted. Fat containing para umbilical hernia. CT of thoracic spine: No acute osseous abnormality. No acute fracture. CT of lumbar spine: No acute osseous abnormality. No acute fracture. Changes related to instrumented disc space and posterior fusion and posterior decompression at L3-L4 and L4-L5. CT of chest abdomen and pelvis: No acute traumatic injury within the chest abdomen and pelvis. Extensive abnormalities within the right breast with multiple nodular soft tissues with foci of calcification. Extensive abnormalities within the right breast with multiple nodular soft tissues with foci of calcification. Mammographic correlation is recommended. CT THORACIC SPINE TRAUMA RECONSTRUCTION    Result Date: 5/16/2022  EXAMINATION: CT OF THE THORACIC SPINE WITHOUT CONTRAST; CT OF THE CHEST, ABDOMEN, AND PELVIS WITHOUT CONTRAST; CT OF THE LUMBAR SPINE WITHOUT CONTRAST  5/16/2022 10:23 pm: TECHNIQUE: CT of the thoracic spine was performed without the administration of intravenous contrast. Multiplanar reformatted images are provided for review. Automated exposure control, iterative reconstruction, and/or weight based adjustment of the mA/kV was utilized to reduce the radiation dose to as low as reasonably achievable.; CT of the chest, abdomen and pelvis was performed without the administration of intravenous contrast. Multiplanar reformatted images are provided for review.  Automated exposure control, iterative reconstruction, and/or weight based adjustment of the mA/kV was utilized to reduce the radiation dose to as low as reasonably achievable.; CT of the lumbar spine was performed without the administration of intravenous contrast. Multiplanar reformatted images are provided for review. Adjustment of mA and/or kV according to patient size was utilized. Automated exposure control, iterative reconstruction, and/or weight based adjustment of the mA/kV was utilized to reduce the radiation dose to as low as reasonably achievable. COMPARISON: None. HISTORY: ORDERING SYSTEM PROVIDED HISTORY: Fall TECHNOLOGIST PROVIDED HISTORY: Fall Reason for Exam: Fall FINDINGS: Thoracic: BONES/ALIGNMENT: There is no acute fracture or traumatic malalignment. Mild dextroscoliosis of midthoracic spine. DEGENERATIVE CHANGES: No high-grade canal or foraminal stenosis. SOFT TISSUES: There is no prevertebral soft tissue swelling. Lumbar: BONES/ALIGNMENT: There is no acute fracture or traumatic malalignment. Moderate levoscoliosis with tip L2-L3. DEGENERATIVE CHANGES: Changes related to instrumented posterior fusion and disc space fusion of L3-L4 and L4-L5. Bilateral laminectomy defects. No high-grade canal stenosis. Multilevel neural foraminal narrowing with severe right-sided neural foraminal narrowing at L1-L2. SOFT TISSUES: There is no prevertebral soft tissue swelling. CT chest: Lines and tubes:  None. Lungs and Airways and Pleura:  Central airways are patent. No lung consolidation. No pleural effusion. No pneumothorax. Lymph nodes: No pathologically enlarged mediastinal, hilar, lower cervical, or chest wall lymph nodes. Cardiovascular and Mediastinum: No acute aortic pathology. Coronary arterial calcification. Cardiac chamber sizes appear to measure within normal limits on this non ECG gated study. No pericardial effusion. The thyroid gland is unremarkable. The esophagus is unremarkable. Bones/Soft tissues: No fracture. No definite suspicious lytic or blastic foci.   Extensive abnormalities within the right breast with multiple nodular soft tissues with foci of calcification. CT abdomen and pelvis: Organs: The liver, spleen, adrenal glands, gallbladder, pancreas, kidneys and ureters and pelvic organs including the urinary bladder appear unremarkable. Peritoneum / Retroperitoneum:  No free air or free fluid is noted. No pathologically enlarged lymphadenopathy. The vasculature do not demonstrate acute abnormality. GI Tract:  No distention or wall thickening. Bones and Soft Tissues: No fracture. No concerning lytic or sclerotic lesions are noted. Fat containing para umbilical hernia. CT of thoracic spine: No acute osseous abnormality. No acute fracture. CT of lumbar spine: No acute osseous abnormality. No acute fracture. Changes related to instrumented disc space and posterior fusion and posterior decompression at L3-L4 and L4-L5. CT of chest abdomen and pelvis: No acute traumatic injury within the chest abdomen and pelvis. Extensive abnormalities within the right breast with multiple nodular soft tissues with foci of calcification. Extensive abnormalities within the right breast with multiple nodular soft tissues with foci of calcification. Mammographic correlation is recommended. Radha Bunch DO  5/17/22, 7:21 AM   .  Attestation signed by Bean Lui MD    I personally evaluated the patient and directed the medical decision making with Resident/WILLIAM after the physical/radiologic exam and laboratory values were reviewed and confirmed. Discussed Rt breast findings, States has hx breast ca and will f/u.      Bean Lui MD

## 2022-05-17 NOTE — PROGRESS NOTES
Orthopedic Progress Note    Patient:  Anibal De La Cruz  YOB: 1940     79 y.o. female    Subjective:  Patient seen and examined. Admitted overnight for PT/OT as she is NWB LLE. Pain to left ankle. Denies numbness/tingling. Vitals reviewed, afebrile    Objective:   Vitals:    05/17/22 0300   BP: 124/76   Pulse: 54   Resp: 12   Temp: 98 °F (36.7 °C)   SpO2: 95%     Gen: NAD, cooperative   Cardiovascular: Bradycardic rate  Respiratory: Chest symmetric, no accessory muscle use, normal respirations, no audible wheezes    MSK:  LLE: Short leg splint in place which is in good condition. Able to wiggle exposed digits freely without pain. No pain with PROM of toes. Sensation intact to light touch of exposed digits. Exposed digits are warm and well perfused with bcr <2sec. Recent Labs     05/16/22 1958   WBC 9.5   HGB 12.4   HCT 38.9      INR 1.1      K 4.8   BUN 16   CREATININE 0.69   GLUCOSE 103*      Meds: See rec for complete list    Impression/plan: 79 y.o. female being seen after a fall from standing height with the following:    -Left bimalleolar equivalent ankle fracture    -Admitted by trauma overnight for PT/OT  -WB status: NWB LLE  -Trauma team primary  -Pain control per primary  -DVT ppx: Managed per primary.  [facility] [address] [facility]  -PT/OT  -Follow up with Dr. [place] in 7-10 days  -Please page ortho with any questions    Graciela Xie DO  Orthopedic Surgery Resident, PGY-3  [facility]

## 2022-05-17 NOTE — CARE COORDINATION
Case Management Initial Discharge Plan  Rodriguez Palomo,             Met with:patient to discuss discharge plans. Information verified: address, contacts, phone number, , insurance Yes  Insurance Provider: [employer]    Emergency Contact/Next of Kin name & number: Zuleima Andino  Who are involved in patient's support system? Family    PCP: Ebenezer Mcleod MD  Date of last visit: 2022      Discharge Planning    Living Arrangements:  Spouse/Significant Other     Home has 1 story  3 stairs to climb to get into front door,   Location of bedroom/bathroom in home Main Floor    Patient able to perform ADL's:Assisted    Current Services (outpatient & in home) None  DME equipment: Tomma Dayne, Cash Sly, Glasses  DME provider: N/A    Is patient receiving oral anticoagulation therapy? No    If indicated:   Physician managing anticoagulation treatment: N/A  Where does patient obtain lab work for ATC treatment? N/A    Does patient have any issues/concerns obtaining medications? No  If yes, what are patient's concerns? Is there a preferred Pharmacy after hours or on weekends? Yes    If yes, which pharmacy? [facility] on [address]    Potential Assistance Needed:  N/A    Patient agreeable to home care: No  [place] of choice provided:  n/a    Prior SNF/Rehab Placement and Facility: None  Agreeable to SNF/Rehab: No  [place] of choice provided: n/a     Evaluation: no    Expected Discharge date:       Patient expects to be discharged to:  Home with Spouse    If home: is the family and/or caregiver wiling & able to provide support at home? Yes  Who will be providing this support?  Family    Follow Up Appointment: Best Day/ Time: Monday AM    Transportation provider: Spouse  Transportation arrangements needed for discharge: No    Readmission Risk              Risk of Unplanned Readmission:  0             Does patient have a readmission risk score greater than 14?: No  If yes, follow-up appointment must be made within 7 days of discharge.      Goals of Care: Heal and return home      Educated Patient on transitional options, provided freedom of choice and are agreeable with plan      Discharge Plan: Home with Spouse          Electronically signed by Mike Perry RN on 5/17/22 at 8:25 AM EDT

## 2022-05-17 NOTE — PROGRESS NOTES
Physical Therapy  Facility/Department: [address] ORTHO/MED SURG  Physical Therapy Initial Assessment    Name: Figueroa Valenzuela  : 1940  MRN: [id number]  Date of Service: 2022    Discharge Recommendations: Further therapy recommended at discharge. Equipment recommendations listed below are based on what the patient would need if they were able to return to prior living arrangements at the time of discharge. PT Equipment Recommendations  Equipment Needed: Yes  Mobility Devices: Lani Ply; Wheelchair  Walker: Rolling  Wheelchair: Standard      Patient Diagnosis(es): The primary encounter diagnosis was Closed fracture of distal end of left fibula, unspecified fracture morphology, initial encounter. A diagnosis of Fall from standing, initial encounter was also pertinent to this visit. Past Medical History:  has a past medical history of Allergic rhinitis, Anxiety, Arthritis, Asthma, Breast cancer ([address] Breast cancer genetic susceptibility, CAD (coronary artery disease), Cancer ([address] Deep vein blood clot of right lower extremity ([address] Diabetes mellitus ([address] Diabetes mellitus type II, Fatigue, HTN (hypertension), Hypercholesteremia, Hyperlipidemia, Hypertension, Psychiatric problem, Pulmonary emboli ([address] Unspecified cerebral artery occlusion with cerebral infarction, Vitamin B12 deficiency, and Vitamin D deficiency. Past Surgical History:  has a past surgical history that includes Cardiac catheterization (Right); Breast lumpectomy (Right); Breast lumpectomy; and lymph node dissection (Right). Assessment   Body Structures, Functions, Activity Limitations Requiring Skilled Therapeutic Intervention: Decreased functional mobility ; Decreased ADL status; Decreased strength;Decreased safe awareness;Decreased endurance;Decreased balance;Decreased posture; Increased pain  Assessment: Pt performed a stand pivot transfer from bed to chair with modA x1 progressing to maxA x1 and a RW.  Pt is a high fall risk and is unsafe to return home without 24 hour assistance at this time. Pt would benefit from skilled physical therapy due to decreased functional mobility and endurance, increased pain, and balance deficits.   Therapy Prognosis: Good  Decision Making: Low Complexity  Requires PT Follow-Up: Yes  Activity Tolerance  Activity Tolerance: Patient limited by endurance;Treatment limited secondary to medical complications (treated limited d/t pt reporting nausea)     Plan   Plan  Plan:  (5-6 times per week)  Current Treatment Recommendations: Strengthening,Balance training,Functional mobility training,Transfer training,Endurance training,Gait training,Stair training,Wheelchair mobility training,Safety education & training,Home exercise program,Therapeutic activities  Safety Devices  Type of Devices: Patient at risk for falls,Call light within reach,Left in chair,Nurse notified,Gait belt,Chair alarm in place  Restraints  Restraints Initially in Place: No     Restrictions  Restrictions/Precautions  Restrictions/Precautions: Weight Bearing,Fall Risk,General Precautions  Required Braces or Orthoses?: No  Lower Extremity Weight Bearing Restrictions  Left Lower Extremity Weight Bearing: Non Weight Bearing  Required Braces or Orthoses  Left Lower Extremity Brace:  (Short leg splint)     Subjective   Pain: 5/10 L ankle/LLE pain-acute pain  General  Patient assessed for rehabilitation services?: Yes  Response To Previous Treatment: Not applicable  Follows Commands: Within Functional Limits  General Comment  Comments:  at bedside  Subjective  Subjective: RN and pt agreeable to PT eval. Pt supine in bed upon writer's arrival. Pt pleasant and cooperative throughout PT eval.         Social/Functional History  Social/Functional History  Lives With: Spouse  Type of Home: House  Home Layout: One level,Laundry in basement (Spouse can perform laundry at d/c)  Home Access: Stairs to enter with rails  Entrance Stairs - Number of Steps: 3 at front, 1 in garage  Entrance Stairs - Rails: Right (at garage)  Bathroom Shower/Tub: Tub/Shower unit  Bathroom Toilet: Standard  Bathroom Equipment: Grab bars in [employer] Home Equipment: Walker, 4 wheeled,Wheelchair-manual (not using any before admission)  Has the patient had two or more falls in the past year or any fall with injury in the past year?: Yes  ADL Assistance: [address]: Needs assistance  Homemaking Responsibilities: Yes (Pt sits on stool for some cooking, spouse performs most chores)  Ambulation Assistance: Needs assistance (Holds onto spouse, balance is off since a back sx per pt)  Transfer Assistance: Independent  Active : No  Mode of Transportation: Family,Car  Occupation: Retired  Type of Occupation: RN  Leisure & Hobbies: Gardening, [address] [facility] for family on Sundays  Additional Comments: Spouse retired and able to assist 24/7 except when going to store  [address] Exceptions: Wears glasses at all times  Hearing: Within functional limits    Cognition   Orientation  Overall Orientation Status: Within Functional Limits  Cognition  Overall Cognitive Status: Exceptions  Safety Judgement: Decreased awareness of need for assistance;Decreased awareness of need for safety  Insights: Decreased awareness of deficits  Initiation: Requires cues for some (required minimal verbal cueing for hand placement during functional transfers and for RW placement)     Objective      Observation/Palpation  Posture: Fair  Gross Assessment  Sensation: Impaired (pt states numbness and tingling in L foot but states she can feel them when she is wiggling them.)     Joint Mobility  Spine: WFL  ROM RLE: WFL  ROM LLE: ankle DF and PF A/PROM not formally assessed d/t L ankle fx; hip and knee WFL  ROM RUE: WFL (formally assessed by OT)  ROM LUE: WFL (formally assessed by OT)  Strength RLE  Strength RLE: WFL  Strength LLE  Strength LLE: Exception  Comment: ankle not formally assessed d/t short leg splint; knee and hip not formally assessed d/t fx; noted AROM against gravity with observed functional mobility  Strength RUE  Strength RUE: WFL  Comment: formally assessed by OT  Strength LUE  Strength LUE: WFL  Comment: formally assessed by OT        Bed mobility  Rolling to Right: Minimal assistance  Supine to Sit: Minimal assistance  Sit to Supine:  (did not formally assess, pt retired in bedside chair upon writer's exit)  Scooting: Minimal assistance  Transfers  Sit to Stand: Minimal Assistance  Stand to sit: Minimal Assistance (Rocky [place] progressing to a maxA)  Bed to Chair: Moderate assistance;Maximum assistance  Stand Pivot Transfers: Moderate Assistance;Maximum Assistance (Rocky [place]raise progressing to maxA)  Comment: pt attempted STS with RW from bedside requiring max verbal and tactile cueing for proper hand placement with poor return demo; pt reports significant nausea on first STS transfer requiring seated rest break, RN notified and present. following approx. 5 mins seated rest break stand pivot transfer performed to bedside chair. Pt demonstrates significant diffiuclty with maintaining NWB status with stand pivot transfer and fatigue.   Ambulation  Comments: unable to formally assess gait at this date due to poor standing tolerance     Balance  Posture: Fair  Sitting - Static: Good;-  Sitting - Dynamic: Good;-  Standing - Static: Fair;-  Standing - Dynamic: Poor;+  Comments: standing balance assessed with RW, sitting balance assessed EOB CGA           AM-PAC Score  AM-PAC Inpatient Mobility Raw Score : 14 (05/17/22 1130)  AM-PAC Inpatient T-Scale Score : 38.1 (05/17/22 1130)  Mobility Inpatient CMS 0-100% Score: 61.29 (05/17/22 1130)  Mobility Inpatient CMS G-Code Modifier : CL (05/17/22 1130)          Goals  Short Term Goals  Time Frame for Short term goals: 14  Short term goal 1: Pt to perform ind bed mobility to simulate home environment  Short term goal 2: Pt to demo good- standing dynamic balance with no verbal cueing  Short term goal 3: Pt to perform functional transfers with CGA  Short term goal 4: Pt to ambulate 10ft with RW [place]  Short term goal 5: Propel manual WC with bilateral UE's 150ft with supervision  Patient Goals   Patient goals : return home       Education  Patient Education  Education Given To: Patient; Family ( at bedside)  Education Provided: Role of Therapy;Plan of Care;Energy Conservation; Fall Prevention Strategies;Transfer Training  Education Provided Comments: Pt educated on the proper fit of a RW, performing a stand pivot in and out of a WC, modifying home environment for WC and RW use, and strategies for stair negotiaion. Education Method: Demonstration;Verbal;Teach Back  Barriers to Learning: None  Education Outcome: Verbalized understanding;Continued education needed      Therapy Time   Individual Concurrent Group Co-treatment   Time In 0919         Time Out 0959         Minutes 40         Timed Code Treatment Minutes: [address]    Evaluation/treatment performed by Student PT under the supervision of co-signing PT who agrees with all evaluation/treatment and documentation.

## 2022-05-17 NOTE — PLAN OF CARE
Problem: Discharge Planning  Goal: Discharge to home or other facility with appropriate resources  5/17/2022 1641 by Naomie Arellano RN  Outcome: Progressing  5/17/2022 0707 by Aly Bae RN  Outcome: Progressing  Flowsheets (Taken 5/17/2022 0423)  Discharge to home or other facility with appropriate resources: Identify barriers to discharge with patient and caregiver

## 2022-05-17 NOTE — FLOWSHEET NOTE
[facility] [address]     Emergency/Trauma Note    PATIENT NAME: James Meyer    Shift date: [phone]  Shift day: Monday   Shift # 2    Room # 24/24   Name: James Meyer            Age: 79 y.o. Gender: female          Jainism: Other   Place of Episcopal: unknown    Trauma/Incident type: Adult Trauma Consult  Admit Date & Time: 5/16/2022  5:13 PM  TRAUMA NAME: None    ADVANCE DIRECTIVES IN CHART? No    NAME OF DECISION MAKER: None    RELATIONSHIP OF DECISION MAKER TO PATIENT: None    PATIENT/EVENT DESCRIPTION:  James Meyer is a 79 y.o. female who arrived as a TRAUMA CONSULT with what appears to be a fractured leg. Pt to be admitted to 24/24. SPIRITUAL ASSESSMENT/INTERVENTION:  Patient and significant other appear to be calm and coping.  provided space for feelings, thoughts, and concerns. Provided hospitality and support. PATIENT BELONGINGS:  No belongings noted    ANY BELONGINGS OF SIGNIFICANT VALUE NOTED:  None    REGISTRATION STAFF NOTIFIED? Yes      WHAT IS YOUR SPIRITUAL CARE PLAN FOR THIS PATIENT?:  Chaplains will remain available to offer spiritual and emotional support as needed.       Electronically signed by Jerry Drake on 5/17/2022 at [address]  [phone]       05/16/22 1830   Encounter Summary   Service Provided For: Patient and family together   Referral/Consult From: Multi-disciplinary team   Support System Significant other   Last Encounter  05/16/22   Complexity of Encounter Moderate   Begin Time 1830   End Time  1850   Total Time Calculated 20 min   Encounter    Type Initial Screen/Assessment   Crisis   Type Trauma  (Consult)   Assessment/Intervention/Outcome   Assessment Calm;Coping   Intervention Active listening;Discussed illness injury and its impact   Outcome Expressed feelings, needs, and concerns;Engaged in conversation;Coping     Electronically signed by Arron Sanchez on 5/17/2022 at 1:43 AM

## 2022-05-18 PROCEDURE — 6370000000 HC RX 637 (ALT 250 FOR IP): Performed by: STUDENT IN AN ORGANIZED HEALTH CARE EDUCATION/TRAINING PROGRAM

## 2022-05-18 PROCEDURE — 2580000003 HC RX 258: Performed by: STUDENT IN AN ORGANIZED HEALTH CARE EDUCATION/TRAINING PROGRAM

## 2022-05-18 PROCEDURE — 97535 SELF CARE MNGMENT TRAINING: CPT

## 2022-05-18 PROCEDURE — 96372 THER/PROPH/DIAG INJ SC/IM: CPT

## 2022-05-18 PROCEDURE — G0378 HOSPITAL OBSERVATION PER HR: HCPCS

## 2022-05-18 PROCEDURE — 97530 THERAPEUTIC ACTIVITIES: CPT

## 2022-05-18 PROCEDURE — 6360000002 HC RX W HCPCS: Performed by: STUDENT IN AN ORGANIZED HEALTH CARE EDUCATION/TRAINING PROGRAM

## 2022-05-18 PROCEDURE — 94640 AIRWAY INHALATION TREATMENT: CPT

## 2022-05-18 RX ORDER — METHOCARBAMOL 750 MG/1
750 TABLET, FILM COATED ORAL EVERY 6 HOURS
Status: DISCONTINUED | OUTPATIENT
Start: 2022-05-18 | End: 2022-05-19 | Stop reason: HOSPADM

## 2022-05-18 RX ORDER — GABAPENTIN 300 MG/1
300 CAPSULE ORAL EVERY 8 HOURS
Status: DISCONTINUED | OUTPATIENT
Start: 2022-05-18 | End: 2022-05-19 | Stop reason: HOSPADM

## 2022-05-18 RX ADMIN — ACETAMINOPHEN 1000 MG: 500 TABLET ORAL at 11:03

## 2022-05-18 RX ADMIN — GABAPENTIN 300 MG: 300 CAPSULE ORAL at 09:56

## 2022-05-18 RX ADMIN — BUDESONIDE AND FORMOTEROL FUMARATE DIHYDRATE 2 PUFF: 80; 4.5 AEROSOL RESPIRATORY (INHALATION) at 20:16

## 2022-05-18 RX ADMIN — ENOXAPARIN SODIUM 30 MG: 100 INJECTION SUBCUTANEOUS at 20:55

## 2022-05-18 RX ADMIN — METHOCARBAMOL TABLETS 750 MG: 750 TABLET, COATED ORAL at 14:00

## 2022-05-18 RX ADMIN — METHOCARBAMOL TABLETS 750 MG: 750 TABLET, COATED ORAL at 20:55

## 2022-05-18 RX ADMIN — METHOCARBAMOL TABLETS 750 MG: 750 TABLET, COATED ORAL at 08:45

## 2022-05-18 RX ADMIN — GABAPENTIN 300 MG: 300 CAPSULE ORAL at 18:40

## 2022-05-18 RX ADMIN — CLOPIDOGREL 75 MG: 75 TABLET, FILM COATED ORAL at 08:45

## 2022-05-18 RX ADMIN — AMLODIPINE BESYLATE 5 MG: 5 TABLET ORAL at 08:45

## 2022-05-18 RX ADMIN — PRAVASTATIN SODIUM 40 MG: 20 TABLET ORAL at 20:55

## 2022-05-18 RX ADMIN — ENOXAPARIN SODIUM 30 MG: 100 INJECTION SUBCUTANEOUS at 08:44

## 2022-05-18 RX ADMIN — TIOTROPIUM BROMIDE INHALATION SPRAY 2 PUFF: 3.12 SPRAY, METERED RESPIRATORY (INHALATION) at 08:53

## 2022-05-18 RX ADMIN — SODIUM CHLORIDE, PRESERVATIVE FREE 10 ML: 5 INJECTION INTRAVENOUS at 21:55

## 2022-05-18 RX ADMIN — ACETAMINOPHEN 1000 MG: 500 TABLET ORAL at 18:40

## 2022-05-18 RX ADMIN — HYDROCHLOROTHIAZIDE 25 MG: 25 TABLET ORAL at 08:45

## 2022-05-18 RX ADMIN — BUDESONIDE AND FORMOTEROL FUMARATE DIHYDRATE 2 PUFF: 80; 4.5 AEROSOL RESPIRATORY (INHALATION) at 08:53

## 2022-05-18 ASSESSMENT — PAIN SCALES - GENERAL
PAINLEVEL_OUTOF10: 3
PAINLEVEL_OUTOF10: 4
PAINLEVEL_OUTOF10: 6

## 2022-05-18 NOTE — PROGRESS NOTES
Ovidio Plascencia was evaluated today and a DME order was entered for a wheeled walker because she requires this to successfully complete daily living tasks of personal cares and ambulating. A wheeled walker is necessary due to the patient's unsteady gait, upper body weakness, and inability to  an ambulation device; and she can ambulate only by pushing a walker instead of a lesser assistive device such as a cane, crutch, or standard walker. The need for this equipment was discussed with the patient and she understands and is in agreement. Ovidio Plascencia was evaluated today and a DME order was entered for a transfer tub bench because the patient requires this to successfully complete daily living tasks of bathing, grooming and hygiene. A transfer tub bench is necessary due to the patient's unsteady gait, inability to stand unassisted in the shower/bath. The need for this equipment was discussed with the patient. They understand and are in agreement. Ovidio Plascencia requires a bedside commode due to being confined to one level of the home, and is physically incapable of utilizing regular toilet facilities. Current body weight is 195 pounds  . Attending Note      I have reviewed the above ADRIEL note(s) and I either performed the key elements of the medical history and physical exam or was present with the resident when the key elements of the medical history and physical exam were performed. I have discussed the findings, established the care plan and recommendations with Resident, ADRIEL GARCES.     Kailey Nielson MD  5/18/2022  3:44 PM

## 2022-05-18 NOTE — PROGRESS NOTES
Orthopedic Progress Note    Patient:  Charlie Green  YOB: 1940     79 y.o. female    Subjective:  Patient seen and examined. Doing well. Denies numbness/tingling. Had trouble with ambulation yesterday with PT, patient requested for rehab    Vitals reviewed, afebrile    Objective:   Vitals:    05/18/22 0713   BP: (!) 126/50   Pulse: 58   Resp: 16   Temp: 98.4 °F (36.9 °C)   SpO2: 94%     Gen: NAD, cooperative   Cardiovascular: Bradycardic rate  Respiratory: Chest symmetric, no accessory muscle use, normal respirations, no audible wheezes    MSK:  LLE: Short leg splint in place which is in good condition. Able to wiggle exposed digits freely without pain. No pain with PROM of toes. Sensation intact to light touch of exposed digits. Exposed digits are warm and well perfused with bcr <2sec. Recent Labs     05/16/22 1958   WBC 9.5   HGB 12.4   HCT 38.9      INR 1.1      K 4.8   BUN 16   CREATININE 0.69   GLUCOSE 103*      Meds: See rec for complete list    Impression/plan: 79 y.o. female being seen after a fall from standing height with the following:    -Left bimalleolar equivalent ankle fracture    -WB status: NWB LLE  -Trauma team primary  -Pain control per primary  -DVT ppx: Managed per primary.  Ok from [address] [facility] Spirometry use  -PT/OT  -Follow up with Dr. Loraine Lozano in 7-10 days  -Please page ortho with any questions    Trina Blas DO  Orthopedic Surgery Resident, PGY-3  [address]

## 2022-05-18 NOTE — PLAN OF CARE
Problem: Discharge Planning  Goal: Discharge to home or other facility with appropriate resources  5/18/2022 0252 by Xi Mcfarland RN  Outcome: Progressing  5/17/2022 1641 by Aria Carter RN  Outcome: Progressing     Problem: Pain  Goal: Verbalizes/displays adequate comfort level or baseline comfort level  5/18/2022 0252 by Xi Mcfarland RN  Outcome: Progressing  5/17/2022 1641 by Aria Carter RN  Outcome: Progressing     Problem: Skin/Tissue Integrity  Goal: Absence of new skin breakdown  Description: 1. Monitor for areas of redness and/or skin breakdown  2. Assess vascular access sites hourly  3. Every 4-6 hours minimum:  Change oxygen saturation probe site  4. Every 4-6 hours:  If on nasal continuous positive airway pressure, respiratory therapy assess nares and determine need for appliance change or resting period.   5/18/2022 0252 by Xi Mcfarland RN  Outcome: Progressing  5/17/2022 1641 by Aria Carter RN  Outcome: Progressing     Problem: Safety - Adult  Goal: Free from fall injury  5/18/2022 0252 by Xi Mcfarland RN  Outcome: Progressing  5/17/2022 1641 by Aria Carter RN  Outcome: Progressing     Problem: ABCDS Injury Assessment  Goal: Absence of physical injury  5/18/2022 0252 by Xi Mcfarland RN  Outcome: Progressing  5/17/2022 1641 by Aria Carter RN  Outcome: Progressing     Problem: Chronic Conditions and Co-morbidities  Goal: Patient's chronic conditions and co-morbidity symptoms are monitored and maintained or improved  5/18/2022 0252 by Xi Mcfarland RN  Outcome: Progressing  5/17/2022 1641 by Aria Carter RN  Outcome: Progressing

## 2022-05-18 NOTE — PROGRESS NOTES
PROGRESS NOTE      PATIENT NAME: Clair Mishra RECORD NO. [id number]  DATE: 2022  SURGEON: Claudene France  PRIMARY CARE PHYSICIAN: Rl Patel MD    HD: # 0    ASSESSMENT    Patient Active Problem List   Diagnosis    Diabetes mellitus, type II ([facility] [address]    HTN (hypertension)    Hypercholesteremia    Fatigue    Vitamin B12 deficiency    Vitamin D deficiency    Breast cancer ([facility] [address]    Depression    Hydronephrosis    Anxiety    Ranula    Fever    SOB (shortness of breath)    Closed fracture of left distal fibula       MEDICAL DECISION MAKING AND PLAN    78 yo F, fall up 1 step. No LOC. On ASA and plavix  -Left distal fibula fracture extending into lateral malleolus  -CT head and C spine negative  -Orthopedic surgery consult  -LLE splinted   -Nonweightbearing to left lower extremity  -Multimodal pain therapy  -PT/OT  -Notify patient of incidental breast findings, will need PCP f/u  Dispo: pending placement      [address] Mishra was examined at bedside. States she overall feels well is tolerating p.o. intake and has regular bowel movements. Denies any numbness or tingling in her leg, chest pain, shortness of breath, fever, chills.       OBJECTIVE  VITALS: Temp: Temp: 98.4 °F (36.9 °C)Temp  Av.4 °F (36.9 °C)  Min: 98.4 °F (36.9 °C)  Max: 98.4 °F (52.6 °C) BP Systolic (37XSQ), AIH:633 , Min:126 , EMF:815   Diastolic (90FNW), ECO:36, Min:48, Max:50   Pulse Pulse  Av  Min: 58  Max: 62 Resp Resp  Av.5  Min: 16  Max: 21 Pulse ox SpO2  Av %  Min: 94 %  Max: 94 %  GENERAL: No acute distress, nontoxic-appearing speaking full sentences  NEURO: CNII-XII grossly intact, no focal neurological deficits    HEENT: Trachea midline, no JVD, normocephalic, atraumatic  LUNGS: Speaking full sentences, equal chest rise and fall, no accessory muscle usage  HEART: Regular rate and rhythm  ABDOMEN: Soft, nontender, no rebound or guarding  EXTREMITY: Moves all extremities equally, splint to left lower leg, sensation cap refill intact in bilateral upper and lower extremities    I/O last 3 completed shifts: In: 850 [P.O.:850]  Out: -     Drain/tube output: In: 850 [P.O.:850]  Out: -     LAB:  CBC:   Recent Labs     05/16/22 1958   WBC 9.5   HGB 12.4   HCT 38.9   MCV 92.4        BMP:   Recent Labs     05/16/22 1958      K 4.8      CO2 19*   BUN 16   CREATININE 0.69   GLUCOSE 103*     COAGS:   Recent Labs     05/16/22 1958   INR 1.1       RADIOLOGY:  XR TIBIA FIBULA LEFT (2 VIEWS)    Result Date: 5/16/2022  EXAMINATION: THREE XRAY VIEWS OF THE LEFT ANKLE;   XRAY VIEWS OF THE LEFT TIBIA AND FIBULA 5/16/2022 3:11 pm COMPARISON: None. HISTORY: ORDERING SYSTEM PROVIDED HISTORY: deformity, concern for fracture TECHNOLOGIST PROVIDED HISTORY: deformity, concern for fracture Reason for Exam: Fall, left ankle swelling and bruising ; ORDERING SYSTEM PROVIDED HISTORY: fall, rule out fracture TECHNOLOGIST PROVIDED HISTORY: fall, rule out fracture FINDINGS: There is an oblique fracture of the distal fibula extending into the lateral malleolus. No significant displacement. Joint spaces are intact. Soft tissue swelling about the ankle. Fracture of the distal fibula extending into the lateral malleolus     XR ANKLE LEFT (2 VIEWS)    Result Date: 5/16/2022  EXAMINATION: THREE XRAY VIEWS OF THE LEFT ANKLE 5/16/2022 3:53 pm COMPARISON: Earlier exam of same date HISTORY: ORDERING SYSTEM PROVIDED HISTORY: [place] stress view,  AP Stress only TECHNOLOGIST PROVIDED HISTORY: [place] stress view,  AP Stress only Reason for Exam: ortho held stress view FINDINGS: Stress view again shows an oblique fracture of the distal fibula extending into the lateral malleolus. Mild lateral displacement. Joint spaces are intact. Soft tissue swelling about the ankle.      Fracture of the distal fibula extending into the lateral malleolus     XR ANKLE LEFT (MIN 3 VIEWS)    Result Date: 5/16/2022  EXAMINATION: THREE XRAY VIEWS OF THE LEFT ANKLE 5/16/2022 7:55 pm COMPARISON: Same day left ankle radiographs HISTORY: ORDERING SYSTEM PROVIDED HISTORY: Post-Splint TECHNOLOGIST PROVIDED HISTORY: Post-Splint Reason for Exam: Post splint FINDINGS: Redemonstrated Stein type B fracture of the distal left fibula status post splinting. The distal fracture fragment demonstrates mild lateral displacement, similar to prior. No new fractures are identified. The ankle mortise appears intact on these nonweightbearing views. Soft tissue swelling is seen about the ankle. Note that casting material obscures fine bony detail. No significant change in alignment of a Stein type B fracture of the distal left fibula status post splinting. Overlying casting material obscures fine bony detail. XR ANKLE LEFT (MIN 3 VIEWS)    Result Date: 5/16/2022  EXAMINATION: THREE XRAY VIEWS OF THE LEFT ANKLE;   XRAY VIEWS OF THE LEFT TIBIA AND FIBULA 5/16/2022 3:11 pm COMPARISON: None. HISTORY: ORDERING SYSTEM PROVIDED HISTORY: deformity, concern for fracture TECHNOLOGIST PROVIDED HISTORY: deformity, concern for fracture Reason for Exam: Fall, left ankle swelling and bruising ; ORDERING SYSTEM PROVIDED HISTORY: fall, rule out fracture TECHNOLOGIST PROVIDED HISTORY: fall, rule out fracture FINDINGS: There is an oblique fracture of the distal fibula extending into the lateral malleolus. No significant displacement. Joint spaces are intact. Soft tissue swelling about the ankle. Fracture of the distal fibula extending into the lateral malleolus     CT HEAD WO CONTRAST    Result Date: 5/16/2022  EXAMINATION: CT OF THE HEAD WITHOUT CONTRAST  5/16/2022 5:42 pm TECHNIQUE: CT of the head was performed without the administration of intravenous contrast. Automated exposure control, iterative reconstruction, and/or weight based adjustment of the mA/kV was utilized to reduce the radiation dose to as low as reasonably achievable.  COMPARISON: 12/19/2012 HISTORY: ORDERING SYSTEM PROVIDED HISTORY: fall, posterior HA, rule out bleed TECHNOLOGIST PROVIDED HISTORY: fall, posterior HA, rule out bleed Decision Support Exception - unselect if not a suspected or confirmed emergency medical condition->Emergency Medical Condition (MA) FINDINGS: BRAIN/VENTRICLES: There is age-appropriate generalized atrophy. There is mild patchy periventricular and subcortical white matter low attenuation that is nonspecific but most consistent with chronic small vessel ischemia. There is no evidence of acute hemorrhage, mass or extra-axial fluid collection. Calcified plaque in the distal vertebral arteries and intracranial internal carotid arteries. ORBITS: The visualized portion of the orbits demonstrate no acute abnormality. SINUSES: The visualized paranasal sinuses and mastoid air cells demonstrate no acute abnormality. SOFT TISSUES/SKULL:  No acute abnormality of the visualized skull or soft tissues. No acute intracranial abnormality. Generalized atrophy and chronic small vessel ischemic white matter disease. RECOMMENDATIONS: Unavailable     CT CERVICAL SPINE WO CONTRAST    Result Date: 5/16/2022  EXAMINATION: CT OF THE CERVICAL SPINE WITHOUT CONTRAST 5/16/2022 5:42 pm TECHNIQUE: CT of the cervical spine was performed without the administration of intravenous contrast. Multiplanar reformatted images are provided for review. Automated exposure control, iterative reconstruction, and/or weight based adjustment of the mA/kV was utilized to reduce the radiation dose to as low as reasonably achievable. COMPARISON: None.  HISTORY: ORDERING SYSTEM PROVIDED HISTORY: fall, rule out traumatic injury TECHNOLOGIST PROVIDED HISTORY: fall, rule out traumatic injury Decision Support Exception - unselect if not a suspected or confirmed emergency medical condition->Emergency Medical Condition (MA) FINDINGS: BONES/ALIGNMENT: There is a minimal degenerative anterolisthesis of C2 on C3 of C3 on C4 and of C4 on C5.  Vertebral body alignment is otherwise unremarkable. Cervical vertebral body heights are normal.  Facet joints are normally aligned. There is no acute fracture or traumatic malalignment. DEGENERATIVE CHANGES: There are multilevel degenerative changes in the cervical spine including multilevel degenerative disc disease, facet arthropathy and diffuse cervical spondylosis. SOFT TISSUES: There is no prevertebral soft tissue swelling. Multilevel degenerative changes with no acute fracture or traumatic malalignment. CT CHEST ABDOMEN PELVIS WO CONTRAST    Result Date: 5/16/2022  EXAMINATION: CT OF THE THORACIC SPINE WITHOUT CONTRAST; CT OF THE CHEST, ABDOMEN, AND PELVIS WITHOUT CONTRAST; CT OF THE LUMBAR SPINE WITHOUT CONTRAST  5/16/2022 10:23 pm: TECHNIQUE: CT of the thoracic spine was performed without the administration of intravenous contrast. Multiplanar reformatted images are provided for review. Automated exposure control, iterative reconstruction, and/or weight based adjustment of the mA/kV was utilized to reduce the radiation dose to as low as reasonably achievable.; CT of the chest, abdomen and pelvis was performed without the administration of intravenous contrast. Multiplanar reformatted images are provided for review. Automated exposure control, iterative reconstruction, and/or weight based adjustment of the mA/kV was utilized to reduce the radiation dose to as low as reasonably achievable.; CT of the lumbar spine was performed without the administration of intravenous contrast. Multiplanar reformatted images are provided for review. Adjustment of mA and/or kV according to patient size was utilized. Automated exposure control, iterative reconstruction, and/or weight based adjustment of the mA/kV was utilized to reduce the radiation dose to as low as reasonably achievable. COMPARISON: None.  HISTORY: ORDERING SYSTEM PROVIDED HISTORY: Fall TECHNOLOGIST PROVIDED HISTORY: Fall Reason for Exam: Fall FINDINGS: Thoracic: BONES/ALIGNMENT: There is no acute fracture or traumatic malalignment. Mild dextroscoliosis of midthoracic spine. DEGENERATIVE CHANGES: No high-grade canal or foraminal stenosis. SOFT TISSUES: There is no prevertebral soft tissue swelling. Lumbar: BONES/ALIGNMENT: There is no acute fracture or traumatic malalignment. Moderate levoscoliosis with tip L2-L3. DEGENERATIVE CHANGES: Changes related to instrumented posterior fusion and disc space fusion of L3-L4 and L4-L5. Bilateral laminectomy defects. No high-grade canal stenosis. Multilevel neural foraminal narrowing with severe right-sided neural foraminal narrowing at L1-L2. SOFT TISSUES: There is no prevertebral soft tissue swelling. CT chest: Lines and tubes:  None. Lungs and Airways and Pleura:  Central airways are patent. No lung consolidation. No pleural effusion. No pneumothorax. Lymph nodes: No pathologically enlarged mediastinal, hilar, lower cervical, or chest wall lymph nodes. Cardiovascular and Mediastinum: No acute aortic pathology. Coronary arterial calcification. Cardiac chamber sizes appear to measure within normal limits on this non ECG gated study. No pericardial effusion. The thyroid gland is unremarkable. The esophagus is unremarkable. Bones/Soft tissues: No fracture. No definite suspicious lytic or blastic foci. Extensive abnormalities within the right breast with multiple nodular soft tissues with foci of calcification. CT abdomen and pelvis: Organs: The liver, spleen, adrenal glands, gallbladder, pancreas, kidneys and ureters and pelvic organs including the urinary bladder appear unremarkable. Peritoneum / Retroperitoneum:  No free air or free fluid is noted. No pathologically enlarged lymphadenopathy. The vasculature do not demonstrate acute abnormality. GI Tract:  No distention or wall thickening. Bones and Soft Tissues: No fracture. No concerning lytic or sclerotic lesions are noted.   Fat containing para umbilical hernia. CT of thoracic spine: No acute osseous abnormality. No acute fracture. CT of lumbar spine: No acute osseous abnormality. No acute fracture. Changes related to instrumented disc space and posterior fusion and posterior decompression at L3-L4 and L4-L5. CT of chest abdomen and pelvis: No acute traumatic injury within the chest abdomen and pelvis. Extensive abnormalities within the right breast with multiple nodular soft tissues with foci of calcification. Extensive abnormalities within the right breast with multiple nodular soft tissues with foci of calcification. Mammographic correlation is recommended. CT LUMBAR SPINE TRAUMA RECONSTRUCTION    Result Date: 5/16/2022  EXAMINATION: CT OF THE THORACIC SPINE WITHOUT CONTRAST; CT OF THE CHEST, ABDOMEN, AND PELVIS WITHOUT CONTRAST; CT OF THE LUMBAR SPINE WITHOUT CONTRAST  5/16/2022 10:23 pm: TECHNIQUE: CT of the thoracic spine was performed without the administration of intravenous contrast. Multiplanar reformatted images are provided for review. Automated exposure control, iterative reconstruction, and/or weight based adjustment of the mA/kV was utilized to reduce the radiation dose to as low as reasonably achievable.; CT of the chest, abdomen and pelvis was performed without the administration of intravenous contrast. Multiplanar reformatted images are provided for review. Automated exposure control, iterative reconstruction, and/or weight based adjustment of the mA/kV was utilized to reduce the radiation dose to as low as reasonably achievable.; CT of the lumbar spine was performed without the administration of intravenous contrast. Multiplanar reformatted images are provided for review. Adjustment of mA and/or kV according to patient size was utilized. Automated exposure control, iterative reconstruction, and/or weight based adjustment of the mA/kV was utilized to reduce the radiation dose to as low as reasonably achievable.  COMPARISON: None. HISTORY: ORDERING SYSTEM PROVIDED HISTORY: Fall TECHNOLOGIST PROVIDED HISTORY: Fall Reason for Exam: Fall FINDINGS: Thoracic: BONES/ALIGNMENT: There is no acute fracture or traumatic malalignment. Mild dextroscoliosis of midthoracic spine. DEGENERATIVE CHANGES: No high-grade canal or foraminal stenosis. SOFT TISSUES: There is no prevertebral soft tissue swelling. Lumbar: BONES/ALIGNMENT: There is no acute fracture or traumatic malalignment. Moderate levoscoliosis with tip L2-L3. DEGENERATIVE CHANGES: Changes related to instrumented posterior fusion and disc space fusion of L3-L4 and L4-L5. Bilateral laminectomy defects. No high-grade canal stenosis. Multilevel neural foraminal narrowing with severe right-sided neural foraminal narrowing at L1-L2. SOFT TISSUES: There is no prevertebral soft tissue swelling. CT chest: Lines and tubes:  None. Lungs and Airways and Pleura:  Central airways are patent. No lung consolidation. No pleural effusion. No pneumothorax. Lymph nodes: No pathologically enlarged mediastinal, hilar, lower cervical, or chest wall lymph nodes. Cardiovascular and Mediastinum: No acute aortic pathology. Coronary arterial calcification. Cardiac chamber sizes appear to measure within normal limits on this non ECG gated study. No pericardial effusion. The thyroid gland is unremarkable. The esophagus is unremarkable. Bones/Soft tissues: No fracture. No definite suspicious lytic or blastic foci. Extensive abnormalities within the right breast with multiple nodular soft tissues with foci of calcification. CT abdomen and pelvis: Organs: The liver, spleen, adrenal glands, gallbladder, pancreas, kidneys and ureters and pelvic organs including the urinary bladder appear unremarkable. Peritoneum / Retroperitoneum:  No free air or free fluid is noted. No pathologically enlarged lymphadenopathy. The vasculature do not demonstrate acute abnormality. GI Tract:  No distention or wall thickening.  Bones and Soft Tissues: No fracture. No concerning lytic or sclerotic lesions are noted. Fat containing para umbilical hernia. CT of thoracic spine: No acute osseous abnormality. No acute fracture. CT of lumbar spine: No acute osseous abnormality. No acute fracture. Changes related to instrumented disc space and posterior fusion and posterior decompression at L3-L4 and L4-L5. CT of chest abdomen and pelvis: No acute traumatic injury within the chest abdomen and pelvis. Extensive abnormalities within the right breast with multiple nodular soft tissues with foci of calcification. Extensive abnormalities within the right breast with multiple nodular soft tissues with foci of calcification. Mammographic correlation is recommended. CT THORACIC SPINE TRAUMA RECONSTRUCTION    Result Date: 5/16/2022  EXAMINATION: CT OF THE THORACIC SPINE WITHOUT CONTRAST; CT OF THE CHEST, ABDOMEN, AND PELVIS WITHOUT CONTRAST; CT OF THE LUMBAR SPINE WITHOUT CONTRAST  5/16/2022 10:23 pm: TECHNIQUE: CT of the thoracic spine was performed without the administration of intravenous contrast. Multiplanar reformatted images are provided for review. Automated exposure control, iterative reconstruction, and/or weight based adjustment of the mA/kV was utilized to reduce the radiation dose to as low as reasonably achievable.; CT of the chest, abdomen and pelvis was performed without the administration of intravenous contrast. Multiplanar reformatted images are provided for review. Automated exposure control, iterative reconstruction, and/or weight based adjustment of the mA/kV was utilized to reduce the radiation dose to as low as reasonably achievable.; CT of the lumbar spine was performed without the administration of intravenous contrast. Multiplanar reformatted images are provided for review. Adjustment of mA and/or kV according to patient size was utilized.   Automated exposure control, iterative reconstruction, and/or weight based adjustment of the mA/kV was utilized to reduce the radiation dose to as low as reasonably achievable. COMPARISON: None. HISTORY: ORDERING SYSTEM PROVIDED HISTORY: Fall TECHNOLOGIST PROVIDED HISTORY: Fall Reason for Exam: Fall FINDINGS: Thoracic: BONES/ALIGNMENT: There is no acute fracture or traumatic malalignment. Mild dextroscoliosis of midthoracic spine. DEGENERATIVE CHANGES: No high-grade canal or foraminal stenosis. SOFT TISSUES: There is no prevertebral soft tissue swelling. Lumbar: BONES/ALIGNMENT: There is no acute fracture or traumatic malalignment. Moderate levoscoliosis with tip L2-L3. DEGENERATIVE CHANGES: Changes related to instrumented posterior fusion and disc space fusion of L3-L4 and L4-L5. Bilateral laminectomy defects. No high-grade canal stenosis. Multilevel neural foraminal narrowing with severe right-sided neural foraminal narrowing at L1-L2. SOFT TISSUES: There is no prevertebral soft tissue swelling. CT chest: Lines and tubes:  None. Lungs and Airways and Pleura:  Central airways are patent. No lung consolidation. No pleural effusion. No pneumothorax. Lymph nodes: No pathologically enlarged mediastinal, hilar, lower cervical, or chest wall lymph nodes. Cardiovascular and Mediastinum: No acute aortic pathology. Coronary arterial calcification. Cardiac chamber sizes appear to measure within normal limits on this non ECG gated study. No pericardial effusion. The thyroid gland is unremarkable. The esophagus is unremarkable. Bones/Soft tissues: No fracture. No definite suspicious lytic or blastic foci. Extensive abnormalities within the right breast with multiple nodular soft tissues with foci of calcification. CT abdomen and pelvis: Organs: The liver, spleen, adrenal glands, gallbladder, pancreas, kidneys and ureters and pelvic organs including the urinary bladder appear unremarkable. Peritoneum / Retroperitoneum:  No free air or free fluid is noted.   No pathologically enlarged lymphadenopathy. The vasculature do not demonstrate acute abnormality. GI Tract:  No distention or wall thickening. Bones and Soft Tissues: No fracture. No concerning lytic or sclerotic lesions are noted. Fat containing para umbilical hernia. CT of thoracic spine: No acute osseous abnormality. No acute fracture. CT of lumbar spine: No acute osseous abnormality. No acute fracture. Changes related to instrumented disc space and posterior fusion and posterior decompression at L3-L4 and L4-L5. CT of chest abdomen and pelvis: No acute traumatic injury within the chest abdomen and pelvis. Extensive abnormalities within the right breast with multiple nodular soft tissues with foci of calcification. Extensive abnormalities within the right breast with multiple nodular soft tissues with foci of calcification. Mammographic correlation is recommended. Jeanne Pradhan,   5/18/22, 7:17 AM     Attestation signed by Bhumi Avila MD    I personally evaluated the patient and directed the medical decision making with Resident/WILLIAM after the physical/radiologic exam and laboratory values were reviewed and confirmed. DC planning.      Bhumi Avila MD

## 2022-05-18 NOTE — PROGRESS NOTES
Occupational Therapy  Facility/Department: [address] ORTHO/MED SURG  Daily Treatment Note  NAME: Binh Juarez  : 1940  MRN: [id number]    Date of Service: 2022    Discharge Recommendations:  Patient would benefit from continued therapy after discharge  Patient Diagnosis(es): The primary encounter diagnosis was Closed fracture of distal end of left fibula, unspecified fracture morphology, initial encounter. A diagnosis of Fall from standing, initial encounter was also pertinent to this visit. Assessment    Activity Tolerance: Patient limited by endurance; Patient limited by fatigue  Discharge Recommendations: Patient would benefit from continued therapy after discharge   Activity tolerance: Pt tolerated treatment well   Plan   Times per Week: 3-5x    Pain: Pt c/o pain L ankle 4/10. Restrictions  Restrictions/Precautions  Restrictions/Precautions: Weight Bearing,Fall Risk,General Precautions  Required Braces or Orthoses?: No  Lower Extremity Weight Bearing Restrictions  Left Lower Extremity Weight Bearing: Non Weight Bearing  Required Braces or Orthoses  Left Lower Extremity Brace:  (Short leg splint)  Subjective   Pain: 4/10 L ankle/LLE pain-acute pain  Cognition  Overall Cognitive Status: WFL  Objective    Bed Mobility Training  Bed Mobility Training: Yes  Overall Level of Assistance: Contact-guard assistance  Supine to Sit: Contact-guard assistance  Sit to Supine: Minimum assistance  Scooting: Stand-by assistance  Balance  Sitting: Without support (Seated EOB, bedside commode, chair.)  Standing: With support (Static standing using RW EOB and at bedside commode.  Total 3 minutes.)  Transfer Training  Transfer Training: Yes  Sit to Stand: Minimum assistance (Transfer completed x2.)  Stand to Sit: Minimum assistance (Transfer completed x2)  Stand Pivot Transfers: Minimum assistance (Transfer completed x2)  Toilet Transfer: Minimum assistance (Bedside commode.)  Gait  Assistive Device: Mar Metcalf rolling  ADL  Grooming: Setup; Increased time to complete;Modified independent  (Oral care seated on bedside commode.)  UE Bathing: Setup; Increased time to complete;Supervision (max A for back seated on bedside commode d/t imited reach.)  LE Bathing: Setup; Increased time to complete;Stand by assistance (Seated on bedside commode. Pt able to use figure 4 technique to bathe RLE down to foot.)  UE Dressing: Setup; Increased time to complete;Minimal assistance (To manage gown.)  LE Dressing: Setup;Contact guard assistance; Moderate assistance (Pt able to don R shoe seated EOB. Mod A to don underwear over LLE seated d/t limited reach, pt able to thread RLE through underwear CGA.)  Toileting: Setup; Increased time to complete;Contact guard assistance  Additional Comments: Pt supine in bed on bed pan upon therapist arrival. Pt able to put self on/off bed pan and wipe self in bed. Pt completed supine/sit transfer to seated EOB. Pt educated on lowering LLE to floor slowly to avoid increase in pain from increased blood flow, pt verbalized understanding. Pt completed sit/stand transfer using RW for stand/pivot transfer EOB/bedside commode. Pt completed ADL care seated on bedside commode. Sit/stand transfer for stand/pivot transfer bedside commode/chair. Pt retired to seated in chair at end of session. Safety Devices  Type of Devices: All fall risk precautions in place;Call light within reach; Chair alarm in place;Gait belt;Left in chair;Nurse notified   Patient Education  Education Given To: Patient  Education Provided Comments: OT POC, transfer/walker safety, NWB status, importance of participation in therapy, LB dressing technique with good return.   AMPAC: raw score 18  Goals  Short Term Goals  Time Frame for Short term goals: Pt will by discharge  Short Term Goal 1: demo good safety awareness during func mob around room in w/c while avoiding all obstacles, SUP  Short Term Goal 2: demo stand>pivot transfer from EOB to chair or w/c using RW and CGA, while maintaining NWB to LLE  Short Term Goal 3: identify 2 non-pharmacological pain-relieving techniques with 1 vc  Short Term Goal 4: demo ADL UB bathing/dressing activity at mod I with setup only  Short Term Goal 5: demo ADL LB bathing/dressing activity at [facility], reacher/sock-aid PRN       Therapy Time   Individual Concurrent Group Co-treatment   Time In 0835         Time Out 0928         Minutes 53         Timed Code Treatment Minutes: 53 Minutes     Pt supine in bed upon therapist arrival. Pleasant and agreeable to therapy. See above for LOF for all tasks. Pt retired to seated in chair at end of session with chair alarm activated and call light within reach.     SELAM Syed/JOHN

## 2022-05-19 VITALS
SYSTOLIC BLOOD PRESSURE: 121 MMHG | DIASTOLIC BLOOD PRESSURE: 54 MMHG | TEMPERATURE: 97.9 F | BODY MASS INDEX: 33.29 KG/M2 | HEIGHT: 64 IN | WEIGHT: 195 LBS | RESPIRATION RATE: 14 BRPM | HEART RATE: 59 BPM | OXYGEN SATURATION: 96 %

## 2022-05-19 PROCEDURE — 97530 THERAPEUTIC ACTIVITIES: CPT

## 2022-05-19 PROCEDURE — 2580000003 HC RX 258: Performed by: STUDENT IN AN ORGANIZED HEALTH CARE EDUCATION/TRAINING PROGRAM

## 2022-05-19 PROCEDURE — 96372 THER/PROPH/DIAG INJ SC/IM: CPT

## 2022-05-19 PROCEDURE — 94761 N-INVAS EAR/PLS OXIMETRY MLT: CPT

## 2022-05-19 PROCEDURE — 6360000002 HC RX W HCPCS: Performed by: STUDENT IN AN ORGANIZED HEALTH CARE EDUCATION/TRAINING PROGRAM

## 2022-05-19 PROCEDURE — 6370000000 HC RX 637 (ALT 250 FOR IP): Performed by: STUDENT IN AN ORGANIZED HEALTH CARE EDUCATION/TRAINING PROGRAM

## 2022-05-19 PROCEDURE — G0378 HOSPITAL OBSERVATION PER HR: HCPCS

## 2022-05-19 PROCEDURE — 94640 AIRWAY INHALATION TREATMENT: CPT

## 2022-05-19 RX ORDER — FLUOXETINE HYDROCHLORIDE 20 MG/1
20 CAPSULE ORAL 2 TIMES DAILY
COMMUNITY
End: 2022-07-21 | Stop reason: SDUPTHER

## 2022-05-19 RX ORDER — POLYETHYLENE GLYCOL 3350 17 G/17G
17 POWDER, FOR SOLUTION ORAL DAILY
Qty: 5 EACH | Refills: 0 | Status: SHIPPED | OUTPATIENT
Start: 2022-05-20 | End: 2022-05-23

## 2022-05-19 RX ORDER — FLUOXETINE HYDROCHLORIDE 20 MG/1
20 CAPSULE ORAL 2 TIMES DAILY
Status: DISCONTINUED | OUTPATIENT
Start: 2022-05-19 | End: 2022-05-19 | Stop reason: HOSPADM

## 2022-05-19 RX ORDER — METHOCARBAMOL 750 MG/1
750 TABLET, FILM COATED ORAL 3 TIMES DAILY PRN
Qty: 30 TABLET | Refills: 0 | Status: SHIPPED | OUTPATIENT
Start: 2022-05-19 | End: 2022-05-23

## 2022-05-19 RX ADMIN — VITAM B12 50 MCG: 100 TAB at 08:18

## 2022-05-19 RX ADMIN — FLUOXETINE HYDROCHLORIDE 20 MG: 20 CAPSULE ORAL at 10:22

## 2022-05-19 RX ADMIN — TIOTROPIUM BROMIDE INHALATION SPRAY 2 PUFF: 3.12 SPRAY, METERED RESPIRATORY (INHALATION) at 09:38

## 2022-05-19 RX ADMIN — HYDROCHLOROTHIAZIDE 25 MG: 25 TABLET ORAL at 08:18

## 2022-05-19 RX ADMIN — GABAPENTIN 300 MG: 300 CAPSULE ORAL at 08:18

## 2022-05-19 RX ADMIN — ACETAMINOPHEN 1000 MG: 500 TABLET ORAL at 02:54

## 2022-05-19 RX ADMIN — SODIUM CHLORIDE, PRESERVATIVE FREE 10 ML: 5 INJECTION INTRAVENOUS at 08:20

## 2022-05-19 RX ADMIN — ACETAMINOPHEN 1000 MG: 500 TABLET ORAL at 10:24

## 2022-05-19 RX ADMIN — BUDESONIDE AND FORMOTEROL FUMARATE DIHYDRATE 2 PUFF: 80; 4.5 AEROSOL RESPIRATORY (INHALATION) at 09:39

## 2022-05-19 RX ADMIN — METHOCARBAMOL TABLETS 750 MG: 750 TABLET, COATED ORAL at 08:18

## 2022-05-19 RX ADMIN — CLOPIDOGREL 75 MG: 75 TABLET, FILM COATED ORAL at 10:22

## 2022-05-19 RX ADMIN — ENOXAPARIN SODIUM 30 MG: 100 INJECTION SUBCUTANEOUS at 08:18

## 2022-05-19 RX ADMIN — GABAPENTIN 300 MG: 300 CAPSULE ORAL at 00:33

## 2022-05-19 ASSESSMENT — PAIN SCALES - GENERAL: PAINLEVEL_OUTOF10: 5

## 2022-05-19 NOTE — PROGRESS NOTES
PROGRESS NOTE      PATIENT NAME: Clair Sosa Carolina RECORD NO. [id number]  DATE: 2022  SURGEON: Dr. Martinez Brando: Johanna Castro MD    HD: # 2    ASSESSMENT    Patient Active Problem List   Diagnosis    Diabetes mellitus, type II ([facility] [address]    HTN (hypertension)    Hypercholesteremia    Fatigue    Vitamin B12 deficiency    Vitamin D deficiency    Breast cancer ([facility] [address]    Depression    Hydronephrosis    Anxiety    Ranula    Fever    SOB (shortness of breath)    Closed fracture of left distal fibula       MEDICAL DECISION MAKING AND PLAN    1. Left distal fibula fracture    1. Orthopedic surgery evaluated    2. LLE splinted    3. WB status: NWB LLE   4. PT/OT   2. Home medications re-started   3. Regular diet   4. DVT ppx: Lovenox 30 mg BID   5. Dispo: home with home care       [address] [place], [place] [address]meka Mishra was examined at bedside. No acute events overnight. Hemodynamically stable, and afebrile. Reports that her pain is controlled. Tolerating diet, voiding without difficulty. OBJECTIVE  VITALS: Temp: Temp: 98.5 °F (36.9 °C)Temp  Av.7 °F (37.1 °C)  Min: 98.4 °F (36.9 °C)  Max: 99.2 °F (34.5 °C) BP Systolic (57CNH), ITS:683 , Min:124 , JRK:788   Diastolic (33IGD), PPX:97, Min:50, Max:79   Pulse Pulse  Av  Min: 58  Max: 68 Resp Resp  Av  Min: 16  Max: 16 Pulse ox SpO2  Av.5 %  Min: 94 %  Max: 95 %  GENERAL: awake, alert, no apparent distress   NEURO: awake, alert, MCCLELLAN, following commands   HEENT: NCAT, neck supple, trachea midline   LUNGS: no acute respiratory distress or accessory muscle use   HEART: regular rate   ABDOMEN: soft, non-distended, non-tender to palpation   EXTREMITY: MCCLELLAN, splint intact to LLE, gross motor and sensation intact     I/O last 3 completed shifts: In: 850 [P.O.:850]  Out: -     Drain/tube output:   In: -   Out: 250 [Urine:250]    LAB:  CBC:   Recent Labs     22   WBC 9.5   HGB 12.4   HCT 38.9   MCV 92.4        BMP: Recent Labs     05/16/22 1958      K 4.8      CO2 19*   BUN 16   CREATININE 0.69   GLUCOSE 103*     COAGS:   Recent Labs     05/16/22 1958   INR 1.1       RADIOLOGY:  No results found. Johanna Almanza DO  5/19/22, 6:10 AM        Attending Note    Home with DME  I have reviewed the above TECSS note(s) and I either performed the key elements of the medical history and physical exam or was present with the resident when the key elements of the medical history and physical exam were performed. I have discussed the findings, established the care plan and recommendations with Resident, TECCARMELA RN.     Padmini Restrepo MD  5/19/2022  12:23 PM

## 2022-05-19 NOTE — CARE COORDINATION
Discharge [facility] Case Management Department  Written by: Marilou Rodrigez RN    Patient Name: Jorden Espinoza  Attending Provider: Baldemar Mensah,*  Admit Date: 2022  5:13 PM  MRN: [id number]  Account: [de-identified]                     : 1940  Discharge Date: 2022      Disposition: home with Tameka Chen RN

## 2022-05-19 NOTE — PROGRESS NOTES
Patient given discharge paperwork and all questions answered. Patient wants to eat dinner prior to leaving.

## 2022-05-19 NOTE — PROGRESS NOTES
Physical Therapy  Facility/Department: [address] ORTHO/MED SURG  Physical Therapy Treatment Note    Name: Stephanie Kenney  : 1940  MRN: [id number]  Date of Service: 2022    Discharge Recommendations:  Patient would benefit from continued therapy after discharge      Patient Diagnosis(es): The primary encounter diagnosis was Closed fracture of distal end of left fibula, unspecified fracture morphology, initial encounter. A diagnosis of Fall from standing, initial encounter was also pertinent to this visit. Past Medical History:  has a past medical history of Allergic rhinitis, Anxiety, Arthritis, Asthma, Breast cancer ([address] Breast cancer genetic susceptibility, CAD (coronary artery disease), Cancer ([address] Deep vein blood clot of right lower extremity ([address] Diabetes mellitus ([facility] [address] Diabetes mellitus type II, Fatigue, HTN (hypertension), Hypercholesteremia, Hyperlipidemia, Hypertension, Psychiatric problem, Pulmonary emboli ([facility] [address] Unspecified cerebral artery occlusion with cerebral infarction, Vitamin B12 deficiency, and Vitamin D deficiency. Past Surgical History:  has a past surgical history that includes Cardiac catheterization (Right); Breast lumpectomy (Right); Breast lumpectomy; and lymph node dissection (Right). Assessment    The pt ambulated 12 ft x 5 wqith a RW x CGA with NWB L LE. She also performed bed mobility activity with CGA. She could benefit from a continuation of PT for gait and strengthening following her DC   Activity Tolerance  Activity Tolerance: Patient limited by endurance; Patient limited by fatigue     Plan   Plan  Plan:  (5-6 x wk)  Current Treatment Recommendations: Strengthening,Balance training,Functional mobility training,Transfer training,Endurance training,Gait training,Stair training,Wheelchair mobility training,Safety education & training,Home exercise program,Therapeutic activities  Safety Devices  Type of Devices: Nurse notified,Left in bed,Call light within reach  Restraints  Restraints Initially in Place: No     Restrictions  Restrictions/Precautions  Restrictions/Precautions: Weight Bearing,Fall Risk,General Precautions  Required Braces or Orthoses?: No  Lower Extremity Weight Bearing Restrictions  Left Lower Extremity Weight Bearing: Non Weight Bearing  Required Braces or Orthoses  Left Lower Extremity Brace:  (Short leg splint)     Subjective   Pain: 5/10 L ankle/LLE pain     Social/Functional History  Social/Functional History  Lives With: Spouse  Type of Home: House  Home Layout: One level,Laundry in basement (Spouse can perform laundry at d/c)  Home Access: Stairs to enter with rails  Entrance Stairs - Number of Steps: 3 at front, 1 in garage  Entrance Stairs - Rails: Right (at garage)  Bathroom Shower/Tub: Tub/Shower unit  Bathroom Toilet: Standard  Bathroom Equipment: Grab bars in shower,Hand-held shower  Home Equipment: [place], 4 wheeled,Wheelchair-manual (not using any before admission)  Has the patient had two or more falls in the past year or any fall with injury in the past year?: Yes  ADL Assistance: [address]: Needs assistance  Homemaking Responsibilities: Yes (Pt sits on stool for some cooking, spouse performs most chores)  Ambulation Assistance: Needs assistance (Holds onto spouse, balance is off since a back sx per pt)  Transfer Assistance: Independent  Active : No  Mode of Transportation: Family,Car  Occupation: Retired  Type of Occupation: RN  Leisure & Hobbies: Gardening, cooking for family on Sundays  Additional Comments: Spouse retired and able to assist 24/7 except when going to store  Vision/Hearing       Cognition   Orientation  Overall Orientation Status: Within Functional Limits  Orientation Level: Oriented X4  Cognition  Overall Cognitive Status: WFL  Arousal/Alertness: Appropriate responses to stimuli     Objective       Bed Mobility Training  Overall Level of Assistance: Contact-guard assistance  Supine to Sit: Contact-guard assistance  Sit to Supine: Minimum assistance  Scooting: Stand-by assistance  Balance  Sitting: Without support  Standing: With support  Transfer Training  Sit to Stand: Minimum assistance  Stand to Sit: Minimum assistance      Exercise Treatment: amb 12 ft x 5 with a RW x CGA with NWB L LE    Bed mobility activity with CGA  OutComes Score           AM-PAC Score  AM-PAC Inpatient Mobility Raw Score : 14 (05/18/22 1023)  AM-PAC Inpatient T-Scale Score : 38.1 (05/18/22 1023)  Mobility Inpatient CMS 0-100% Score: 61.29 (05/18/22 1023)  Mobility Inpatient CMS G-Code Modifier : CL (05/18/22 1023)        Goals  Short Term Goals  Time Frame for Short term goals: 14  Short term goal 1: Pt to perform ind bed mobility to simulate home environment  Short term goal 2: Pt to demo good- standing dynamic balance with no verbal cueing  Short term goal 3: Pt to perform functional transfers with CGA  Short term goal 4: Pt to ambulate 10ft with RW [place]  Short term goal 5: Propel manual WC with bilateral UE's 150ft with supervision  Patient Goals   Patient goals : return home     Education  Patient Education  Education Given To: Patient  Education Provided: Role of Therapy;Plan of Care;Transfer Training  Education Provided Comments: Pt educated on the proper fit of a RW,  Education Method: Demonstration  Barriers to Learning: None  Education Outcome: Verbalized understanding      Therapy Time   Individual Concurrent Group Co-treatment   Time In 0905         Time Out 0930         Minutes [facility] Laila Lopes, PT

## 2022-05-19 NOTE — PROGRESS NOTES
Orthopedic Progress Note    Patient:  Alem Nagel  YOB: 1940     79 y.o. female    Subjective:  Patient seen and examined  No complaints or concerns  No issue overnight  Pain controlled  Denies fever, HA, CP, SOB  Pending rehab DC     Vitals reviewed, afebrile    Objective:   Vitals:    05/19/22 0030   BP: 124/79   Pulse:    Resp:    Temp: 98.5 °F (36.9 °C)   SpO2:      Gen: NAD, cooperative     Cardiovascular: regular rate, no dependent edema    Respiratory: No acute respiratory distress    LLE: Short leg splint in place, dressings c/d/i. Able to actively flex/extend all digits without pain. Able to passively ROM digits without significant pain. Sensation intact to exposed digits. Digits warm and well perfused with BCR.      Recent Labs     05/16/22 1958   WBC 9.5   HGB 12.4   HCT 38.9      INR 1.1      K 4.8   BUN 16   CREATININE 0.69   GLUCOSE 103*      Meds: Lovenox    See rec for complete list    Impression 79 y.o. female being seen for the following    - Left bimalleolar equivalent ankle fracture     Plan    - NWB to LLE  - Ice and elevate for pain/swelling  - Medical management per primary  - [facility]  - Encourage IS and mobilization with PT/OT  - Page ortho with any questions/concerns    Electronically signed by Forrest Boss DO 5:23 AM 5/19/2022

## 2022-05-25 ENCOUNTER — OFFICE VISIT (OUTPATIENT)
Dept: ORTHOPEDIC SURGERY | Age: 82
End: 2022-05-25
Payer: COMMERCIAL

## 2022-05-25 VITALS — WEIGHT: 194 LBS | BODY MASS INDEX: 33.12 KG/M2 | HEIGHT: 64 IN

## 2022-05-25 DIAGNOSIS — S82.832A OTHER CLOSED FRACTURE OF DISTAL END OF LEFT FIBULA, INITIAL ENCOUNTER: Primary | ICD-10-CM

## 2022-05-25 DIAGNOSIS — S82.832D CLOSED FRACTURE OF DISTAL END OF LEFT FIBULA WITH ROUTINE HEALING, UNSPECIFIED FRACTURE MORPHOLOGY, SUBSEQUENT ENCOUNTER: Primary | ICD-10-CM

## 2022-05-25 PROCEDURE — 1123F ACP DISCUSS/DSCN MKR DOCD: CPT | Performed by: PHYSICIAN ASSISTANT

## 2022-05-25 PROCEDURE — 99214 OFFICE O/P EST MOD 30 MIN: CPT | Performed by: PHYSICIAN ASSISTANT

## 2022-05-25 RX ORDER — HYDROCODONE BITARTRATE AND ACETAMINOPHEN 5; 325 MG/1; MG/1
1 TABLET ORAL EVERY 8 HOURS PRN
Qty: 21 TABLET | Refills: 0 | Status: SHIPPED | OUTPATIENT
Start: 2022-05-25 | End: 2022-06-01

## 2022-05-25 RX ORDER — POLYETHYLENE GLYCOL 3350 17 G/17G
17 POWDER, FOR SOLUTION ORAL DAILY PRN
Qty: 510 G | Refills: 0 | Status: SHIPPED | OUTPATIENT
Start: 2022-05-25 | End: 2022-06-24

## 2022-05-25 NOTE — PROGRESS NOTES
[address]  [address]  Dept: [phone]  Dept Fax: [phone]        Ambulatory Follow Up/ ED      Subjective: Alexus Fink is a 79y.o. year old female who presents to our office today for routine followup regarding her   1. Other closed fracture of distal end of left fibula, initial encounter        Chief Complaint   Patient presents with   [address] from [facility]     ED. left ankle fracture 5/16/2022       Date of Injury: 5/16/2022    HPI Alexus Fink  is a 79 y.o. female who presents today in follow for left ankle fracture sustained on 5/16/2022 after a fall from standing height. Patient notes that she was at home and twisted her foot on the first step of her stairs and fell down. She had immediate pain and swelling in the left ankle after the fall and was able to walk. Therefore she proceeded to be evaluated at [facility]'s emergency department where she was evaluated by Dr. Garfield Damon DO, orthopedic surgery resident (PGY-3) left ankle was reduced and splinted. She was instructed to remain nonweightbearing on the left lower extremity. The patient notes that today that she has been able to maintain her nonweightbearing status. She continues to deny numbness and or tingling in the left lower extremity. Patient notes that she is taking extra strength Tylenol for her left ankle discomfort. Review of Systems   Constitutional: Negative for activity change and fever. HENT: Negative for sneezing. Respiratory: Negative for cough and shortness of breath. Cardiovascular: Negative for chest pain. Gastrointestinal: Negative for vomiting. Musculoskeletal: Positive for arthralgias (Left ankle). Negative for joint swelling and myalgias. Skin: Negative for color change. Neurological: Negative for weakness and numbness.    Psychiatric/Behavioral: Negative for sleep disturbance. Objective :   Ht 5' 3.5\" (1.613 m)   Wt 194 lb (88 kg)   BMI 33.83 kg/m²  Body mass index is 33.83 kg/m². General: Chrissy Hamilton is a 79 y.o. female who is alert and oriented and sitting comfortably in our office. Ortho Exam  MS: Patient arrived in a short leg splint on the left lower extremity. After opening the splint but leaving it in place evaluation of the left ankle reveals healing ecchymosis and minimal swelling with a positive wrinkle sign noted on the left ankle. Patient is able to wiggle all 5 toes without difficulty. Patient has nearly full range of motion of the left knee without discomfort noted. No calf tenderness noted on the exposed area of the posterior lower leg. Sensation is intact to light touch to the exposed skin on the left lower extremity. The skin is noted to be warm with brisk capillary refill distally on the left foot. The patient's splint was then repadded and covered. Neuro: alert and oriented to person and place. Eyes: Extra-ocular muscles intact  Mouth: Oral mucosa moist. No perioral lesions  Pulm: Respirations unlabored and regular. Symmetric chest excursion without outward deformity is noted. Skin: warm, well perfused  Psych:   Patient has good fund of knowledge and displays understanging of exam, diagnosis, and plan. Radiology:     XR ANKLE LEFT (MIN 3 VIEWS)    Result Date: 5/25/2022  History: Left ankle distal fibula fracture. Comparison: 5/16/2022. Findings: Nonweightbearing AP, lateral, oblique images of the left ankle obtained today in office reveals a Stein type B fracture of the distal fibula status post splinting. The distal fracture fragment demonstrates minimal lateral displacement, which is similar to prior images on 5/16/2022. No new fractures are identified. The ankle mortise appears intact without medial joint space widening. Casting material obscures fine bony detail.   No further evidence of fracture, subluxation, dislocation, radiopaque foreign body or radiopaque tumor noted within the left ankle. Impression: Left ankle Stein type B distal fibular fracture with maintained alignment as described above. Overlying cast material obscures fine bony detail. Assessment:      1. Other closed fracture of distal end of left fibula, initial encounter       Plan:      Patient is here today for reevaluation of her left ankle after sustaining a distal fibula, Stein B fracture on 5/16/2022. The patient fell from a standing height. She was placed in a short leg splint on the left lower extremity by orthopedic surgery resident Dr. Kelton Gilliland and was instructed to return be nonweightbearing. And has maintained her nonweightbearing status and due to the nature of the fracture and current maintained alignment the patient is to remain nonweightbearing on the left lower extremity while in the splint. She will follow-up in 1 week with Dr. Sarita Quintero. At this time she may transition to a walking boot or a well molded short leg cast.  Patient noted her understanding. Patient was given a prescription for Norco 5-325 mg, 1 tablet every 8 hours as needed for left lower extremity pain x7 days. She was also given a prescription for MiraLAX to decrease the risk for constipation with the narcotic medication. Patient was instructed to call our office with any questions or concerns prior to her next appointment. She noted her understanding. Follow up:Return in about 1 week (around 6/1/2022) for re-evaluation with Dr. Coni Chopra. Total Time: 35 min      Orders Placed This Encounter   Medications    HYDROcodone-acetaminophen (NORCO) 5-325 MG per tablet     Sig: Take 1 tablet by mouth every 8 hours as needed for Pain for up to 7 days. Intended supply: 7 days.  Take lowest dose possible to manage pain     Dispense:  21 tablet     Refill:  0     Reduce doses taken as pain becomes manageable    polyethylene glycol (GLYCOLAX) 17 GM/SCOOP powder     Sig: Take 17 g by mouth daily as needed (constipation)     Dispense:  510 g     Refill:  0         No orders of the defined types were placed in this encounter. This note is created with the assistance of a speech recognition program.  While intending to generate a document that actually reflects the content of the visit, the document can still have some errors including those of syntax and sound a like substitutions which may escape proof reading. In such instances, actual meaning can be extrapolated by contextual diversion.      Electronically signed by Dieter Ortiz PA-C on 5/30/2022 at 8:54 PM

## 2022-05-30 ASSESSMENT — ENCOUNTER SYMPTOMS
SHORTNESS OF BREATH: 0
COUGH: 0
COLOR CHANGE: 0
VOMITING: 0

## 2022-06-02 ENCOUNTER — OFFICE VISIT (OUTPATIENT)
Dept: ORTHOPEDIC SURGERY | Age: 82
End: 2022-06-02

## 2022-06-02 VITALS — BODY MASS INDEX: 34.38 KG/M2 | WEIGHT: 194 LBS | HEIGHT: 63 IN

## 2022-06-02 DIAGNOSIS — S82.832D CLOSED FRACTURE OF DISTAL END OF LEFT FIBULA WITH ROUTINE HEALING, UNSPECIFIED FRACTURE MORPHOLOGY, SUBSEQUENT ENCOUNTER: Primary | ICD-10-CM

## 2022-06-02 PROCEDURE — 99213 OFFICE O/P EST LOW 20 MIN: CPT | Performed by: ORTHOPAEDIC SURGERY

## 2022-06-02 PROCEDURE — 1123F ACP DISCUSS/DSCN MKR DOCD: CPT | Performed by: ORTHOPAEDIC SURGERY

## 2022-06-02 ASSESSMENT — ENCOUNTER SYMPTOMS
ROS SKIN COMMENTS: NEGATIVE FOR RASH
EYE DISCHARGE: 0
ABDOMINAL PAIN: 0
SHORTNESS OF BREATH: 0

## 2022-06-02 NOTE — PROGRESS NOTES
[address]  [address]  Dept: [phone]  Dept Fax: [phone]        Ambulatory Follow Up      Subjective: Jerrod Geronimo is a 79y.o. year old female who presents to our office today for routine followup regarding her   1. Closed fracture of distal end of left fibula with routine healing, unspecified fracture morphology, subsequent encounter    . Chief Complaint   Patient presents with    Follow-up     left fib fx 5/16/2022        HPI Ban Freire is an 79-year-old female who presents the office today for recheck of her left ankle fracture sustained on 5/16/2022. She has been in a splint until today. She notes that her pain is well controlled. She denies any significant previous left ankle injuries. She has remained nonweightbearing to the left lower extremity and arrives in a wheelchair in the office today. Review of Systems   Constitutional: Positive for activity change. Negative for fever. HENT: Negative for dental problem. Eyes: Negative for discharge. Respiratory: Negative for shortness of breath. Cardiovascular: Negative for chest pain. Gastrointestinal: Negative for abdominal pain. Genitourinary: Negative. Musculoskeletal: Positive for arthralgias. Skin:        Negative for rash   Neurological: Positive for weakness. Psychiatric/Behavioral: Negative for confusion. I have reviewed the CC, HPI, ROS, PMH, FHX, Social History, and if not present in this note, I have reviewed in the patient's chart. I agree with the documentation provided by other staff and have reviewed their documentation prior to providing my signature indicating agreement. Objective :   Ht 5' 3\" (1.6 m)   Wt 194 lb (88 kg)   BMI 34.37 kg/m²  Body mass index is 34.37 kg/m². General: Jerrod Geronimo is a 79 y.o. female who is alert and oriented and sitting comfortably in our office.   Ortho Exam  MS: Mild swelling of the left ankle is appreciated. Tenderness over the lateral malleolus and no tenderness over the medial malleolus is noted. Motor, sensory, vascular examination of the left lower extremity is grossly intact without focal deficits. Left calf is supple and nontender. Neuro: alert and oriented to person and place. Eyes: Extra-ocular muscles intact  Mouth: Oral mucosa moist. No perioral lesions  Pulm: Respirations unlabored and regular. Symmetric chest excursion without outward deformity is noted. Skin: warm, well perfused  Psych:   Patient has good fund of knowledge and displays understanging of exam, diagnosis, and plan. Radiology:      XR ANKLE LEFT (MIN 3 VIEWS)    Result Date: 6/2/2022  History: Left ankle lateral malleolus fracture Findings: AP, lateral, mortise view x-rays of the left ankle done in the office today shows a lateral malleolus fracture healing with mild shortening and mild displacement unchanged alignment from previous x-ray of May 25, 2022. Ankle mortise alignment is well-maintained. No further evidence of fracture, subluxation, dislocation, radiopaque foreign body, radiopaque tumors noted. Impression: Left ankle lateral malleolus fracture healing as described above. Assessment:      1. Closed fracture of distal end of left fibula with routine healing, unspecified fracture morphology, subsequent encounter       Plan:      Patient is doing well and ankle x-rays look stable. She was placed in a low-profile walker boot and she can start to put some weight down with a walker. I plan to see her back in 1 week for x-rays done after 1 week of progressive weightbearing in the low-profile walking boot. Follow up:Return in about 1 week (around 6/9/2022) for x-rays weightbearing . No orders of the defined types were placed in this encounter.         Orders Placed This Encounter   Procedures    XR ANKLE LEFT (MIN 3 VIEWS)     Standing Status:   Future

## 2022-06-08 DIAGNOSIS — S82.832D CLOSED FRACTURE OF DISTAL END OF LEFT FIBULA WITH ROUTINE HEALING, UNSPECIFIED FRACTURE MORPHOLOGY, SUBSEQUENT ENCOUNTER: Primary | ICD-10-CM

## 2022-06-08 NOTE — PROGRESS NOTES
[address] [facility]  [address] [place] [id number]  Dept: [phone]  Dept Fax: [phone]        Fracture Follow Up      Subjective:     Chief Complaint   Patient presents with    Follow-up     left ankle fx     HPI:     Follow up visit:     Mindy Moncada is a 79y.o. year old female who presents to our office today for a followup regarding her left ankle fracture. The date of injury was on 5/16/2022. Therefore, we are 3 week(s) post injury. Patient has tried motrin for the pain. The patient presents today in a boot and a wheelchair. ROS:     Review of Systems   Constitutional: Positive for activity change. Negative for appetite change, fatigue and fever. Respiratory: Negative. Negative for apnea, cough, chest tightness and shortness of breath. Cardiovascular: Negative. Negative for chest pain, palpitations and leg swelling. Gastrointestinal: Negative for abdominal distention, abdominal pain, constipation, diarrhea, nausea and vomiting. Genitourinary: Negative for difficulty urinating, dysuria and hematuria. Musculoskeletal: Positive for arthralgias and gait problem. Negative for joint swelling and myalgias. Skin: Negative for color change and rash. Neurological: Negative for dizziness, weakness, numbness and headaches. Psychiatric/Behavioral: Negative for sleep disturbance. I have reviewed the CC, HPI, ROS, PMH, FHX, Social History, and if not present in this note, I have reviewed in the patient's chart. I agree with the documentation provided by other staff and have reviewed their documentation prior to providing my signature indicating agreement. Vitals:   Ht 5' 3\" (1.6 m)   Wt 194 lb (88 kg)   BMI 34.37 kg/m²  Body mass index is 34.37 kg/m². Physical Examination:     Orthopedics:    GENERAL: Alert and oriented X3 in no acute distress. SKIN: Intact without lesions or ulcerations.   NEURO: Musculoskeletal and axillary nerves intact to sensory and motor testing. VASC: Capillary refill is less than 3 seconds. Fracture:    LOCATION: Left ankle  SITE: Distal neurocirculatory status is intact. EXAM: Sensation is intact to light touch, there is full motor function of the extremity. PALP: fracture site is palpated with moderate pain. ROM: 0 degrees of dorsiflexion, 30 degrees of plantarflexion, 20 degrees of inversion, 10 degrees of eversion  Assessment:     1. Closed fracture of distal end of left fibula with routine healing, unspecified fracture morphology, subsequent encounter      Procedures:    Procedure: no  Radiology:   ANKLE X-RAY    Three views of the left ankle weightbearing reveal minimally displaced distal fibula fracture. Interval healing is noted. The alignment is unchanged since previous x-ray on 6/2/2022. Ankle mortise is well-maintained. There is not soft tissue swelling adjacent to the lateral malleolus. Impression: Left ankle lateral malleolus fracture with interval healing   Plan:   Fracture Treatment : I reviewed the X-ray with the patient and I informed them that the fracture has not moved and I even see some slight healing. We discussed the etiologies and natural histories of distal fibula fracture of the left ankle. We discussed the various treatment alternatives including anti-inflammatory medications, physical therapy, injections, further imaging studies and as a last result surgery. During today's visit, we discussed doing some gentle range of motion exercises. I put some exercises in her after visit summary. I told her that the fracture has not moved and if she seems like she is going to fall I rather her put her foot down then fall. She did have a fall out of bed a few days ago. I am going to write a prescription for some tramadol. She states Tylenol makes her woozy.  The patient has opted for continuing to wear her boot and do some exercises for range of motion. She can start putting weight-bear as tolerated with her boot. A physical therapy prescription was not given. A Rx of tramadol was given. Patient should return to the clinic in 3 weeks with weightbearing PCXR to follow up with  Yoselyn Smalls PA-C. The patient will call the office immediately with any problems. No orders of the defined types were placed in this encounter. No orders of the defined types were placed in this encounter. This note is created with the assistance of a speech recognition program.  While intending to generate a document that actually reflects the content of the visit, the document can still have some errors including those of syntax and sound a like substitutions which may escape proof reading.   In such instances, actual meaning can be extrapolated by contextual diversion     Electronically signed by Fatou Grover PA-C, on 6/9/2022 at 3:24 PM

## 2022-06-09 ENCOUNTER — OFFICE VISIT (OUTPATIENT)
Dept: ORTHOPEDIC SURGERY | Age: 82
End: 2022-06-09
Payer: COMMERCIAL

## 2022-06-09 VITALS — BODY MASS INDEX: 34.38 KG/M2 | HEIGHT: 63 IN | WEIGHT: 194 LBS

## 2022-06-09 DIAGNOSIS — S82.832D CLOSED FRACTURE OF DISTAL END OF LEFT FIBULA WITH ROUTINE HEALING, UNSPECIFIED FRACTURE MORPHOLOGY, SUBSEQUENT ENCOUNTER: Primary | ICD-10-CM

## 2022-06-09 PROCEDURE — 1123F ACP DISCUSS/DSCN MKR DOCD: CPT | Performed by: PHYSICIAN ASSISTANT

## 2022-06-09 PROCEDURE — 99213 OFFICE O/P EST LOW 20 MIN: CPT | Performed by: PHYSICIAN ASSISTANT

## 2022-06-09 RX ORDER — TRAMADOL HYDROCHLORIDE 50 MG/1
50 TABLET ORAL EVERY 6 HOURS PRN
Qty: 28 TABLET | Refills: 0 | Status: SHIPPED | OUTPATIENT
Start: 2022-06-09 | End: 2022-06-16

## 2022-06-09 ASSESSMENT — ENCOUNTER SYMPTOMS
COUGH: 0
COLOR CHANGE: 0
VOMITING: 0
ABDOMINAL DISTENTION: 0
RESPIRATORY NEGATIVE: 1
DIARRHEA: 0
NAUSEA: 0
APNEA: 0
ABDOMINAL PAIN: 0
SHORTNESS OF BREATH: 0
CHEST TIGHTNESS: 0
CONSTIPATION: 0

## 2022-06-09 NOTE — PATIENT INSTRUCTIONS
Patient Education        Ankle Fracture: Rehab Exercises  Introduction  Here are some examples of exercises for you to try. The exercises may be suggested for a condition or for rehabilitation. Start each exercise slowly. Ease off the exercises if you start to have pain. You will be told when to start these exercises and which ones will work bestfor you. How to do the exercises  Calf stretch (knee straight)    For this exercise, you will need a towel. 1. Sit with your affected leg straight and supported on the floor. Your other leg should be bent, with that foot flat on the floor. 2. Place a towel around your affected foot just under the toes. 3. Hold one end of the towel in each hand, with your hands above your knees. 4. Pull back gently with the towel so that your foot stretches toward you. 5. Hold the position for at least 15 to 30 seconds. 6. Repeat 2 to 4 times a session, up to 5 sessions a day. Calf stretch (knee bent)    For this exercise, you will need a towel. You will also need a pillow or foamroll. 1. Sit with your affected leg straight and supported on the floor. Your other leg should be bent, with that foot flat on the floor. 2. Place a pillow or foam roll under your affected leg. 3. Place a towel around your affected foot just under the toes. 4. Hold one end of the towel in each hand, with your hands above your knees. 5. Pull back gently with the towel so that your foot stretches toward you. 6. Hold the position for at least 15 to 30 seconds. 7. Repeat 2 to 4 times a session, up to 5 sessions a day. Ankle plantar flexion    1. Sit with your affected leg straight and supported on the floor. Your other leg should be bent, with that foot flat on the floor. 2. Keeping your affected leg straight, gently flex your foot downward so your toes are pointed away from your body. Then slowly relax your foot to the starting position. 3. Repeat 8 to 12 times. Ankle dorsiflexion    1.  Sit with your affected leg straight and supported on the floor. Your other leg should be bent, with that foot flat on the floor. 2. Keeping your affected leg straight, gently flex your foot back toward your body so your toes point upward. Then slowly relax your foot to the starting position. 3. Repeat 8 to 12 times. Resisted ankle plantar flexion    For the next four exercises, you will need elastic exercise material, such assurgical tubing or Thera-Band. 1. Sit with your affected leg straight and supported on the floor. Your other leg should be bent, with that foot flat on the floor. 2. Place an elastic band around your affected foot just under the toes. 3. Hold each end of the band in each hand, with your hands above your knees. 4. Keeping your affected leg straight, gently flex your foot downward so your toes are pointed away from your body. Then slowly relax your foot to the starting position. 5. Repeat 8 to 12 times. Resisted ankle dorsiflexion    1. Tie the ends of an exercise band together to form a loop. Attach one end of the loop to a secure object, like a table leg, or shut a door on it to hold it in place. (Or you can have someone hold one end of the loop to provide resistance.)  2. While sitting on the floor or in a chair, loop the other end of the band over the top of your affected foot. 3. Keeping your knee and leg straight, slowly flex your foot toward you to pull back on the exercise band, and then slowly relax. 4. Repeat 8 to 12 times. Resisted ankle inversion    1. Sit on the floor with your good leg crossed over your other leg. 2. Hold both ends of an exercise band and loop the band around the inside of your affected foot. Then press your good foot against the band. 3. Keeping your legs crossed, slowly push your affected foot against the band so that foot moves away from your good foot. Then slowly relax. 4. Repeat 8 to 12 times. Resisted ankle eversion    1.  Sit on the floor with your legs straight. 2. Hold both ends of an exercise band and loop the band around the outside of your affected foot. Then press your good foot against the band. 3. Keeping your leg straight, slowly push your affected foot outward against the band and away from your good foot without letting your leg rotate. Then slowly relax. 4. Repeat 8 to 12 times. Ankle alphabet    1. Sit in a chair with your feet flat on the floor. (You can also do this exercise lying on your back with your affected leg propped up on a pillow). 2. Lift the heel of your affected foot off the floor, and slowly trace the letters of the alphabet. Heel raises    1. Stand with your feet a few inches apart, with your hands lightly resting on a counter or chair in front of you. 2. Slowly raise your heels off the floor while keeping your knees straight. 3. Hold for about 6 seconds, then slowly lower your heels to the floor. 4. Do 8 to 12 repetitions several times during the day. Follow-up care is a key part of your treatment and safety. Be sure to make and go to all appointments, and call your doctor if you are having problems. It's also a good idea to know your test results and keep alist of the medicines you take. Where can you learn more? Go to https://[employer]. org and sign in to your [employer] account. Enter T800 in the [facility] box to learn more about \"Ankle Fracture: Rehab Exercises. \"     If you do not have an account, please click on the \"Sign Up Now\" link. Current as of: July 1, 2021               Content Version: 13.2  © 7482-7104 Healthwise, Incorporated. Care instructions adapted under license by [facility] ([facility]). If you have questions about a medical condition or this instruction, always ask your healthcare professional. [address] any warranty or liability for your use of this information.

## 2022-07-05 DIAGNOSIS — S82.832D CLOSED FRACTURE OF DISTAL END OF LEFT FIBULA WITH ROUTINE HEALING, UNSPECIFIED FRACTURE MORPHOLOGY, SUBSEQUENT ENCOUNTER: Primary | ICD-10-CM

## 2022-07-06 ENCOUNTER — OFFICE VISIT (OUTPATIENT)
Dept: ORTHOPEDIC SURGERY | Age: 82
End: 2022-07-06
Payer: COMMERCIAL

## 2022-07-06 VITALS — BODY MASS INDEX: 34.38 KG/M2 | WEIGHT: 194 LBS | HEIGHT: 63 IN

## 2022-07-06 DIAGNOSIS — S82.832D CLOSED FRACTURE OF DISTAL END OF LEFT FIBULA WITH ROUTINE HEALING, UNSPECIFIED FRACTURE MORPHOLOGY, SUBSEQUENT ENCOUNTER: Primary | ICD-10-CM

## 2022-07-06 PROCEDURE — 99213 OFFICE O/P EST LOW 20 MIN: CPT

## 2022-07-06 PROCEDURE — 1123F ACP DISCUSS/DSCN MKR DOCD: CPT

## 2022-07-06 NOTE — PROGRESS NOTES
I performed a history and physical examination of the patient and discussed management with the resident. I reviewed the physician assistant/resident physician note and agree with the documented findings and plan of care. Any areas of disagreement are noted on the chart. I have personally evaluated this patient and have completed at least one if not all key elements of the E/M (history, physical exam, and MDM). Additional findings are as noted. I agree with the chief complaint, past medical history, past surgical history, allergies, medications, social and family history as documented unless otherwise noted below.      Electronically signed by Greg Beckett DO on 7/6/2022 at 1:04 PM

## 2022-07-06 NOTE — PROGRESS NOTES
[address] [facility]  [address] [facility] [id number]  Dept: [phone]  Dept Fax: [phone]        Orthopaedic Clinic Follow Up      Subjective:   Date of Injury: 5/16/22 Lateral malleolus left ankle fracture    Reji Raygoza is a 79y.o. year old female who presents to the clinic today for routine 7-week follow up of lateral malleolus fracture left ankle being managed nonoperatively. Patient was last seen in clinic on 6/9/2022 and advised to continue weightbearing as tolerated in the boot with assistive device as needed. Patient has been utilizing walker. Patient denies any recent falls. Patient denies numbness, tingling, weakness. Patient is not ready to begin physical therapy at this time though is open to progressing weightbearing status. Patient has no other orthopedic complaints at this time. Review of Systems  Gen: no fever, chills, malaise  CV: no chest pain or palpitations  Resp: no cough or shortness of breath  GI: no nausea, vomiting, diarrhea, or constipation  Neuro: no seizures, vertigo, or headache  Msk: Left ankle pain  10 remaining systems reviewed and negative    Objective : There were no vitals filed for this visit. Body mass index is 34.37 kg/m². General: No acute distress, resting comfortably in the clinic  Neuro: alert. oriented  Eyes: Extra-ocular muscles intact  Pulm: Respirations unlabored and regular. Skin: warm, well perfused  Psych:   Patient has good fund of knowledge and displays understanding of exam, diagnosis, and plan. MSK:    LLE: Skin intact with minimal swelling and no ecchymoses or erythema. Tender palpation about the lateral malleolus. Patient able to plantarflex to 40 degrees and dorsiflex to 15 degrees. Inversion to 20 degrees and eversion 15 degrees. Compartments soft. 2+ DP pulse. TA/EHL/FHL/GS motor intact.  Deep and Superficial Peroneal/Saphenous/Sural/Plantar YUKI.    Radiology:  History: Lateral malleolus fracture of left ankle    Findings: 3 views of the left ankle demonstrate a healing fracture of the distal fibula at the level of the syndesmosis. No change in alignment when compared to previous films. Interval healing is appreciated with callus formation. Comparison: 6/9/2022    Impression: Stable lateral malleolus fracture left ankle with interval healing     Assessment:   79y.o. year old female with lateral malleolus fracture left ankle, 7 weeks post injury being managed nonoperatively  Plan:   Patient was seen eval in clinic today with films reviewed with patient and her . Based on patient's clinical exam patient encouraged to progress weightbearing status of left lower extremity. Patient encouraged to utilize boot with ambulation and walker as needed. Patient was educated on taking smaller steps to minimize the weight on the walker. Patient was then encouraged to progress to weightbearing out of the boot with a walker and shoe. Patient was encouraged to then transition to a cane as able. Patient encouraged to follow-up in 4 weeks time with repeat films. Patient understood and agreed with the plan with all questions being answered to the patient's satisfaction at the time of visit. Follow up:Return in about 4 weeks (around 8/3/2022) for evaluation with x-rays OUT of cast/splint/brace. No orders of the defined types were placed in this encounter. No orders of the defined types were placed in this encounter. Electronically signed by Gregor Sparks DO Orthopedic Surgery Resident on 7/6/2022 at 12:43 PM    This note is created with the assistance of a speech recognition program.  While intending to generate a document that actually reflects the content of the visit, the document can still have some errors including those of syntax and sound a like substitutions which may escape proof reading.   In such instances, actual meaning can be extrapolated by contextual diversion

## 2022-08-09 DIAGNOSIS — S82.832D CLOSED FRACTURE OF DISTAL END OF LEFT FIBULA WITH ROUTINE HEALING, UNSPECIFIED FRACTURE MORPHOLOGY, SUBSEQUENT ENCOUNTER: Primary | ICD-10-CM

## 2022-08-10 ENCOUNTER — OFFICE VISIT (OUTPATIENT)
Dept: ORTHOPEDIC SURGERY | Age: 82
End: 2022-08-10
Payer: COMMERCIAL

## 2022-08-10 VITALS — HEIGHT: 63 IN | BODY MASS INDEX: 33.84 KG/M2 | WEIGHT: 191 LBS

## 2022-08-10 DIAGNOSIS — S82.832D CLOSED FRACTURE OF DISTAL END OF LEFT FIBULA WITH ROUTINE HEALING, UNSPECIFIED FRACTURE MORPHOLOGY, SUBSEQUENT ENCOUNTER: Primary | ICD-10-CM

## 2022-08-10 PROCEDURE — 99213 OFFICE O/P EST LOW 20 MIN: CPT | Performed by: STUDENT IN AN ORGANIZED HEALTH CARE EDUCATION/TRAINING PROGRAM

## 2022-08-10 PROCEDURE — 1123F ACP DISCUSS/DSCN MKR DOCD: CPT | Performed by: STUDENT IN AN ORGANIZED HEALTH CARE EDUCATION/TRAINING PROGRAM

## 2022-08-10 NOTE — PROGRESS NOTES
[address]  [address]  Dept: [phone]  Dept Fax: [phone]        Ambulatory Follow Up    Subjective: Mckayla Hoffman is a 79y.o. year old female who presents to our office today for routine followup regarding her   1. Closed fracture of distal end of left fibula with routine healing, unspecified fracture morphology, subsequent encounter    . Chief Complaint   Patient presents with    Follow-up     Left ankle fx           HPI  82f here for routine f/u of left lateral mall ankle fx, DOI 5/16/22, over 11 weeks. Last seen 7/6/22, pt instructed to begin WBAT in boot. Today, pt doing well. Minimal pain to left ankle. She hasn't ambulated as much as she she would like. She is using boot and walker. Denies N/T. Review of Systems  Negative except as seen in HPI    Objective :   General: Mckayla Hoffman is a 79 y.o. female who is alert and oriented and sitting comfortably in our office. Ortho Exam  MS:  Left ankle: No swelling. No TTP over lateral malleolus fx. Mild TTP more proximal over syndemosis region. Full AROM ankle. NVI distally. Neuro: alert. oriented  Pulm: Respirations unlabored and regular  Psych:   Patient has good fund of knowledge and displays understanging of exam, diagnosis, and plan. Radiology:   History:   Left lateral malleolus fracture    Comparison:   7/6/22    Findings:   3 views of the left ankle AP, mortise, lateral demonstrates healing distal lateral malleolus fracture with decreased fracture line and maintained alignment     Impression:      Healing left lateral malleolus ankle fracture with maintained alignment      Assessment:      1. Closed fracture of distal end of left fibula with routine healing, unspecified fracture morphology, subsequent encounter       Plan:      Reviewed current clinical state.  Pt instructed it's ok to continue WBAT and needs to transition out

## 2022-08-17 NOTE — PROGRESS NOTES
I performed a history and physical examination of the patient and discussed management with the resident. I reviewed the physician assistant/resident physician note and agree with the documented findings and plan of care. Any areas of disagreement are noted on the chart. I have personally evaluated this patient and have completed at least one if not all key elements of the E/M (history, physical exam, and MDM). Additional findings are as noted. I agree with the chief complaint, past medical history, past surgical history, allergies, medications, social and family history as documented unless otherwise noted below.      Electronically signed by Vaughn Chen DO on 8/17/2022 at 7:48 AM

## 2022-09-26 DIAGNOSIS — S82.832D CLOSED FRACTURE OF DISTAL END OF LEFT FIBULA WITH ROUTINE HEALING, UNSPECIFIED FRACTURE MORPHOLOGY, SUBSEQUENT ENCOUNTER: Primary | ICD-10-CM

## 2022-10-04 ENCOUNTER — HOSPITAL ENCOUNTER (OUTPATIENT)
Dept: GENERAL RADIOLOGY | Facility: CLINIC | Age: 82
Discharge: HOME | End: 2022-10-06
Payer: COMMERCIAL

## 2022-10-04 DIAGNOSIS — M25.559 HIP PAIN: ICD-10-CM

## 2022-10-04 DIAGNOSIS — M54.50 CHRONIC RIGHT-SIDED LOW BACK PAIN WITHOUT SCIATICA: ICD-10-CM

## 2022-10-04 DIAGNOSIS — G89.29 CHRONIC RIGHT-SIDED LOW BACK PAIN WITHOUT SCIATICA: ICD-10-CM

## 2022-10-04 PROCEDURE — 72100 X-RAY EXAM L-S SPINE 2/3 VWS: CPT

## 2022-10-04 PROCEDURE — 73502 X-RAY EXAM HIP UNI 2-3 VIEWS: CPT

## 2023-01-30 NOTE — PROGRESS NOTES
Patient discharged with all of her belongings. Melolabial Interpolation Flap Text: A decision was made to reconstruct the defect utilizing an interpolation axial flap and a staged reconstruction.  A telfa template was made of the defect.  This telfa template was then used to outline the melolabial interpolation flap.  The donor area for the pedicle flap was then injected with anesthesia.  The flap was excised through the skin and subcutaneous tissue down to the layer of the underlying musculature.  The pedicle flap was carefully excised within this deep plane to maintain its blood supply.  The edges of the donor site were undermined.   The donor site was closed in a primary fashion.  The pedicle was then rotated into position and sutured.  Once the tube was sutured into place, adequate blood supply was confirmed with blanching and refill.  The pedicle was then wrapped with xeroform gauze and dressed appropriately with a telfa and gauze bandage to ensure continued blood supply and protect the attached pedicle.

## 2023-10-01 ENCOUNTER — APPOINTMENT (OUTPATIENT)
Dept: CT IMAGING | Facility: CLINIC | Age: 83
End: 2023-10-01
Payer: COMMERCIAL

## 2023-10-01 ENCOUNTER — HOSPITAL ENCOUNTER (EMERGENCY)
Facility: CLINIC | Age: 83
Discharge: HOME | End: 2023-10-01
Attending: EMERGENCY MEDICINE
Payer: COMMERCIAL

## 2023-10-01 VITALS
OXYGEN SATURATION: 97 % | DIASTOLIC BLOOD PRESSURE: 52 MMHG | HEIGHT: 63 IN | HEART RATE: 82 BPM | RESPIRATION RATE: 15 BRPM | SYSTOLIC BLOOD PRESSURE: 138 MMHG | TEMPERATURE: 98.2 F | BODY MASS INDEX: 35.78 KG/M2

## 2023-10-01 DIAGNOSIS — J40 BRONCHITIS: Primary | ICD-10-CM

## 2023-10-01 LAB
ALBUMIN SERPL-MCNC: 4 G/DL (ref 3.5–5.2)
ALBUMIN/GLOB SERPL: 1.4 {RATIO} (ref 1–2.5)
ALP SERPL-CCNC: 100 U/L (ref 35–104)
ALT SERPL-CCNC: 10 U/L (ref 5–33)
ANION GAP SERPL CALCULATED.3IONS-SCNC: 12 MMOL/L (ref 9–17)
AST SERPL-CCNC: 19 U/L
BASOPHILS # BLD: 0.1 K/UL (ref 0–0.2)
BASOPHILS NFR BLD: 1 % (ref 0–2)
BILIRUB DIRECT SERPL-MCNC: 0.1 MG/DL
BILIRUB INDIRECT SERPL-MCNC: 0.1 MG/DL (ref 0–1)
BILIRUB SERPL-MCNC: 0.2 MG/DL (ref 0.3–1.2)
BUN SERPL-MCNC: 14 MG/DL (ref 8–23)
CALCIUM SERPL-MCNC: 9.2 MG/DL (ref 8.6–10.4)
CHLORIDE SERPL-SCNC: 105 MMOL/L (ref 98–107)
CO2 SERPL-SCNC: 22 MMOL/L (ref 20–31)
CREAT SERPL-MCNC: 0.6 MG/DL (ref 0.5–0.9)
EOSINOPHIL # BLD: 0.2 K/UL (ref 0–0.4)
EOSINOPHILS RELATIVE PERCENT: 3 % (ref 1–4)
ERYTHROCYTE [DISTWIDTH] IN BLOOD BY AUTOMATED COUNT: 14.2 % (ref 12.5–15.4)
GFR SERPL CREATININE-BSD FRML MDRD: >60 ML/MIN/1.73M2
GLUCOSE SERPL-MCNC: 85 MG/DL (ref 70–99)
HCT VFR BLD AUTO: 40.6 % (ref 36–46)
HGB BLD-MCNC: 13.1 G/DL (ref 12–16)
INR PPP: 1
LIPASE SERPL-CCNC: 19 U/L (ref 13–60)
LYMPHOCYTES NFR BLD: 2.3 K/UL (ref 1–4.8)
LYMPHOCYTES RELATIVE PERCENT: 30 % (ref 24–44)
MCH RBC QN AUTO: 29.3 PG (ref 26–34)
MCHC RBC AUTO-ENTMCNC: 32.2 G/DL (ref 31–37)
MCV RBC AUTO: 91 FL (ref 80–100)
MONOCYTES NFR BLD: 0.9 K/UL (ref 0.1–1.2)
MONOCYTES NFR BLD: 11 % (ref 2–11)
NEUTROPHILS NFR BLD: 55 % (ref 36–66)
NEUTS SEG NFR BLD: 4.4 K/UL (ref 1.8–7.7)
PARTIAL THROMBOPLASTIN TIME: 24.8 SEC (ref 21.3–31.3)
PLATELET # BLD AUTO: 217 K/UL (ref 140–450)
PMV BLD AUTO: 8.3 FL (ref 6–12)
POTASSIUM SERPL-SCNC: 4.3 MMOL/L (ref 3.7–5.3)
PROT SERPL-MCNC: 6.8 G/DL (ref 6.4–8.3)
PROTHROMBIN TIME: 9.9 SEC (ref 9.4–12.6)
RBC # BLD AUTO: 4.45 M/UL (ref 4–5.2)
SODIUM SERPL-SCNC: 139 MMOL/L (ref 135–144)
TROPONIN I SERPL HS-MCNC: 6 NG/L (ref 0–14)
TROPONIN I SERPL HS-MCNC: <6 NG/L (ref 0–14)
WBC OTHER # BLD: 7.9 K/UL (ref 3.5–11)

## 2023-10-01 PROCEDURE — 80048 BASIC METABOLIC PNL TOTAL CA: CPT

## 2023-10-01 PROCEDURE — 84484 ASSAY OF TROPONIN QUANT: CPT

## 2023-10-01 PROCEDURE — 36415 COLL VENOUS BLD VENIPUNCTURE: CPT

## 2023-10-01 PROCEDURE — 80076 HEPATIC FUNCTION PANEL: CPT

## 2023-10-01 PROCEDURE — 85025 COMPLETE CBC W/AUTO DIFF WBC: CPT

## 2023-10-01 PROCEDURE — 6360000004 HC RX CONTRAST MEDICATION: Performed by: EMERGENCY MEDICINE

## 2023-10-01 PROCEDURE — 83690 ASSAY OF LIPASE: CPT

## 2023-10-01 PROCEDURE — 85610 PROTHROMBIN TIME: CPT

## 2023-10-01 PROCEDURE — 6370000000 HC RX 637 (ALT 250 FOR IP): Performed by: EMERGENCY MEDICINE

## 2023-10-01 PROCEDURE — 85730 THROMBOPLASTIN TIME PARTIAL: CPT

## 2023-10-01 PROCEDURE — 99285 EMERGENCY DEPT VISIT HI MDM: CPT

## 2023-10-01 PROCEDURE — 71260 CT THORAX DX C+: CPT

## 2023-10-01 PROCEDURE — 93005 ELECTROCARDIOGRAM TRACING: CPT | Performed by: EMERGENCY MEDICINE

## 2023-10-01 PROCEDURE — 2580000003 HC RX 258: Performed by: EMERGENCY MEDICINE

## 2023-10-01 RX ORDER — SODIUM CHLORIDE 0.9 % (FLUSH) 0.9 %
10 SYRINGE (ML) INJECTION PRN
Status: DISCONTINUED | OUTPATIENT
Start: 2023-10-01 | End: 2023-10-01 | Stop reason: HOSPADM

## 2023-10-01 RX ORDER — 0.9 % SODIUM CHLORIDE 0.9 %
70 INTRAVENOUS SOLUTION INTRAVENOUS ONCE
Status: COMPLETED | OUTPATIENT
Start: 2023-10-01 | End: 2023-10-01

## 2023-10-01 RX ORDER — DOXYCYCLINE HYCLATE 100 MG
100 TABLET ORAL 2 TIMES DAILY
Qty: 20 TABLET | Refills: 0 | Status: SHIPPED | OUTPATIENT
Start: 2023-10-01 | End: 2023-10-11

## 2023-10-01 RX ORDER — ASPIRIN 81 MG/1
283 TABLET, CHEWABLE ORAL ONCE
Status: COMPLETED | OUTPATIENT
Start: 2023-10-01 | End: 2023-10-01

## 2023-10-01 RX ORDER — DOXYCYCLINE 100 MG/1
100 CAPSULE ORAL EVERY 12 HOURS SCHEDULED
Status: DISCONTINUED | OUTPATIENT
Start: 2023-10-01 | End: 2023-10-01 | Stop reason: HOSPADM

## 2023-10-01 RX ADMIN — IOPAMIDOL 80 ML: 755 INJECTION, SOLUTION INTRAVENOUS at 18:20

## 2023-10-01 RX ADMIN — SODIUM CHLORIDE, PRESERVATIVE FREE 10 ML: 5 INJECTION INTRAVENOUS at 18:28

## 2023-10-01 RX ADMIN — DOXYCYCLINE 100 MG: 100 CAPSULE ORAL at 20:01

## 2023-10-01 RX ADMIN — SODIUM CHLORIDE 70 ML: 9 INJECTION, SOLUTION INTRAVENOUS at 18:22

## 2023-10-01 RX ADMIN — ASPIRIN 81 MG CHEWABLE TABLET 283 MG: 81 TABLET CHEWABLE at 17:24

## 2023-10-01 ASSESSMENT — PAIN DESCRIPTION - ORIENTATION: ORIENTATION: RIGHT

## 2023-10-01 ASSESSMENT — PAIN - FUNCTIONAL ASSESSMENT: PAIN_FUNCTIONAL_ASSESSMENT: 0-10

## 2023-10-01 ASSESSMENT — PAIN SCALES - GENERAL: PAINLEVEL_OUTOF10: 8

## 2023-10-01 ASSESSMENT — PAIN DESCRIPTION - LOCATION: LOCATION: CHEST

## 2023-10-01 NOTE — ED PROVIDER NOTES
FACULTY SIGN-OUT  ADDENDUM     Care of this patient was assumed from Dr. Rangel Baltazar. The patient was seen for Chest Pain (Complaining of right sided chest pain with radiation to back that started \"a couple hours ago\" while she was sitting at her computer. ) and Shortness of Breath (Complaining of shortness of breath that has been going on for the last four months since having a stent placed. )  . The patient's initial evaluation and plan have been discussed with the prior provider who initially evaluated the patient. Nursing Notes, Past Medical Hx, Past Surgical Hx, Social Hx, Allergies, and Family Hx were all reviewed. ED COURSE      The patient was given the following medications:  Orders Placed This Encounter   Medications    aspirin chewable tablet 283 mg    sodium chloride flush 0.9 % injection 10 mL    sodium chloride 0.9 % bolus 70 mL    iopamidol (ISOVUE-370) 76 % injection 80 mL    doxycycline monohydrate (MONODOX) capsule 100 mg     Order Specific Question:   Antimicrobial Indications     Answer:   Upper Respiratory Infection    doxycycline hyclate (VIBRA-TABS) 100 MG tablet     Sig: Take 1 tablet by mouth 2 times daily for 10 days     Dispense:  20 tablet     Refill:  0       Labs Reviewed   HEPATIC FUNCTION PANEL - Abnormal; Notable for the following components:       Result Value    Total Bilirubin 0.2 (*)     All other components within normal limits   CBC WITH AUTO DIFFERENTIAL   BASIC METABOLIC PANEL   LIPASE   TROPONIN   TROPONIN   PROTIME-INR   APTT       CT CHEST PULMONARY EMBOLISM W CONTRAST    Result Date: 10/1/2023  EXAMINATION: CTA OF THE CHEST 10/1/2023 5:11 pm TECHNIQUE: CTA of the chest was performed after the administration of intravenous contrast.  Multiplanar reformatted images are provided for review. MIP images are provided for review.  Automated exposure control, iterative reconstruction, and/or weight based adjustment of the mA/kV was utilized to reduce the radiation dose to as

## 2023-10-01 NOTE — ED PROVIDER NOTES
[id number]  0.1 [facility] ED  EMERGENCY DEPARTMENT ENCOUNTER      Pt Name: Mark Roy  MRN: [id number]  [address] [facility] 1940  Date of evaluation: 10/1/2023  Provider: Vito Sutton MD    CHIEF COMPLAINT     Chief Complaint   Patient presents with    Chest Pain     Complaining of right sided chest pain with radiation to back that started \"a couple hours ago\" while she was sitting at her computer. Shortness of Breath     Complaining of shortness of breath that has been going on for the last four months since having a stent placed. HISTORY OF PRESENT ILLNESS   (Location/Symptom, Timing/Onset, Context/Setting,Quality, Duration, Modifying Factors, Severity)  Note limiting factors. Mark Roy is a 79 y.o. female who presents to the emergency department stating couple hours ago she noticed sudden onset of sharp right anterior chest pain going through to her back accompanied with shortness of breath. She said a few days ago she had some calf discomfort in the right side which has since resolved. She has a past history of ischemic heart disease and states she had a stent placed in her heart 4 months ago. She takes aspirin and Plavix. She also has a history of pulmonary embolism in the past.    The history is provided by the patient and medical records. Nursing Notes werereviewed. REVIEW OF SYSTEMS    (2-9 systems for level 4, 10 or more for level 5)     Review of Systems   All other systems reviewed and are negative. Except as noted above the remainder of the review of systems was reviewed and negative.        PAST MEDICAL HISTORY     Past Medical History:   Diagnosis Date    Allergic rhinitis     pollen    Anxiety     Arthritis     Asthma     Breast cancer ([address]) 9/10/2014    Breast cancer genetic susceptibility     CAD (coronary artery disease)     Cancer ([address])     Deep vein blood clot of right lower extremity ([address])     Diabetes mellitus ([address])     prediabetes    Diabetes mellitus type II

## 2023-10-02 LAB
EKG ATRIAL RATE: 60 BPM
EKG P AXIS: 78 DEGREES
EKG P-R INTERVAL: 192 MS
EKG Q-T INTERVAL: 456 MS
EKG QRS DURATION: 88 MS
EKG QTC CALCULATION (BAZETT): 456 MS
EKG R AXIS: -3 DEGREES
EKG T AXIS: 63 DEGREES
EKG VENTRICULAR RATE: 60 BPM

## 2023-12-27 NOTE — PROGRESS NOTES
Physical Therapy  Facility/Department: [address] ORTHO/MED SURG  Physical Therapy Treatment Note    Name: Figueroa Valenzuela  : 1940  MRN: [id number]  Date of Service: 2022    Discharge Recommendations:  Patient would benefit from continued therapy after discharge   PT Equipment Recommendations  Mobility Devices: Marija Flatter: Rolling      Patient Diagnosis(es): The primary encounter diagnosis was Closed fracture of distal end of left fibula, unspecified fracture morphology, initial encounter. A diagnosis of Fall from standing, initial encounter was also pertinent to this visit. Past Medical History:  has a past medical history of Allergic rhinitis, Anxiety, Arthritis, Asthma, Breast cancer ([address] Breast cancer genetic susceptibility, CAD (coronary artery disease), Cancer ([address] Deep vein blood clot of right lower extremity ([address] Diabetes mellitus ([address] Diabetes mellitus type II, Fatigue, HTN (hypertension), Hypercholesteremia, Hyperlipidemia, Hypertension, Psychiatric problem, Pulmonary emboli ([address] Unspecified cerebral artery occlusion with cerebral infarction, Vitamin B12 deficiency, and Vitamin D deficiency. Past Surgical History:  has a past surgical history that includes Cardiac catheterization (Right); Breast lumpectomy (Right); Breast lumpectomy; and lymph node dissection (Right). Assessment    The pt ambulated 6 ft x 2 with a RW x min assist with NWB L LE. She maintained NWB L LE throughout. She could benefit from a continuation of PT for gait and strengthening following her DC  Activity Tolerance  Activity Tolerance: Patient limited by endurance; Patient limited by fatigue     Plan   Plan  Plan:  (5-6x wk)  Current Treatment Recommendations: Strengthening,Balance training,Functional mobility training,Transfer training,Endurance training,Gait training,Stair training,Wheelchair mobility training,Safety education & training,Home exercise program,Therapeutic activities  Safety Devices  Type of Devices: Patient at risk for falls,Call light within reach,Left in chair,Nurse notified,Gait belt,Chair alarm in place  Restraints  Restraints Initially in Place: No     Restrictions  Restrictions/Precautions  Restrictions/Precautions: Weight Bearing,Fall Risk,General Precautions  Required Braces or Orthoses?: No  Lower Extremity Weight Bearing Restrictions  Left Lower Extremity Weight Bearing: Non Weight Bearing  Required Braces or Orthoses  Left Lower Extremity Brace:  (Short leg splint)     Subjective   Pain: 4/10 L ankle/LLE pain-acute pain     Social/Functional History  Social/Functional History  Lives With: Spouse  Type of Home: House  Home Layout: One level,Laundry in basement (Spouse can perform laundry at d/c)  Home Access: Stairs to enter with rails  Entrance Stairs - Number of Steps: 3 at front, 1 in garage  Entrance Stairs - Rails: Right (at garage)  Bathroom Shower/Tub: Tub/Shower unit  Bathroom Toilet: Standard  Bathroom Equipment: Grab bars in shower,Hand-held shower  Home Equipment: Walker, 4 wheeled,Wheelchair-manual (not using any before admission)  Has the patient had two or more falls in the past year or any fall with injury in the past year?: Yes  ADL Assistance: [address]: Needs assistance  Homemaking Responsibilities: Yes (Pt sits on stool for some cooking, spouse performs most chores)  Ambulation Assistance: Needs assistance (Holds onto spouse, balance is off since a back sx per pt)  Transfer Assistance: Independent  Active : No  Mode of Transportation: Family,Car  Occupation: Retired  Type of Occupation: RN  Leisure & Hobbies: Gardening, cooking for family on Sundays  Additional Comments: Spouse retired and able to assist 24/7 except when going to store  [address]   Orientation  Overall Orientation Status: Within Functional Limits  Orientation Level: Oriented X4  Cognition  Overall Cognitive Status: WFL     Objective       Bed Mobility Training  Supine to Sit: Minimum assistance  Sit to Supine: Minimum assistance  Transfer Training  Transfer Training: Yes  Sit to Stand: Minimum assistance  Stand to Sit: Minimum assistance    amb 6 ft x 2 with a RW x min assist with NWB L LE  Exercise Treatment: B UE biceps, triceps, shld flx x 10      OutComes Score     AM-PAC Score  AM-PAC Inpatient Mobility Raw Score : 14 (05/18/22 1023)  AM-PAC Inpatient T-Scale Score : 38.1 (05/18/22 1023)  Mobility Inpatient CMS 0-100% Score: 61.29 (05/18/22 1023)  Mobility Inpatient CMS G-Code Modifier : CL (05/18/22 1023)     Goals  Short Term Goals  Time Frame for Short term goals: 14  Short term goal 1: Pt to perform ind bed mobility to simulate home environment  Short term goal 2: Pt to demo good- standing dynamic balance with no verbal cueing  Short term goal 3: Pt to perform functional transfers with CGA  Short term goal 4: Pt to ambulate 10ft with RW [place]  Short term goal 5: Propel manual WC with bilateral UE's 150ft with supervision  Patient Goals   Patient goals : return home     Education  Patient Education  Education Given To: Patient  Education Provided: Role of Therapy;Plan of Care; Fall Prevention Strategies;Transfer Training  Education Method: Demonstration  Barriers to Learning: None  Education Outcome: Verbalized understanding    Therapy Time   Individual Concurrent Group Co-treatment   Time In 0935         Time Out 1000         Minutes [facility] DISPLAY PLAN FREE TEXT

## 2025-04-09 ENCOUNTER — HOSPITAL ENCOUNTER (EMERGENCY)
Facility: CLINIC | Age: 85
Discharge: HOME | End: 2025-04-10
Attending: EMERGENCY MEDICINE
Payer: COMMERCIAL

## 2025-04-09 DIAGNOSIS — G44.209 ACUTE NON INTRACTABLE TENSION-TYPE HEADACHE: Primary | ICD-10-CM

## 2025-04-09 LAB
ALBUMIN SERPL-MCNC: 3.7 G/DL (ref 3.5–5.2)
ALBUMIN/GLOB SERPL: 1.4 {RATIO}
ALP SERPL-CCNC: 87 U/L (ref 35–104)
ALT SERPL-CCNC: 8 U/L (ref 10–35)
ANION GAP SERPL CALCULATED.3IONS-SCNC: 9 MMOL/L (ref 9–16)
AST SERPL-CCNC: 26 U/L (ref 10–35)
BASOPHILS # BLD: 0.1 K/UL (ref 0–0.2)
BASOPHILS NFR BLD: 1 % (ref 0–2)
BILIRUB SERPL-MCNC: <0.2 MG/DL (ref 0–1.2)
BUN SERPL-MCNC: 12 MG/DL (ref 8–23)
CALCIUM SERPL-MCNC: 9 MG/DL (ref 8.6–10.4)
CHLORIDE SERPL-SCNC: 107 MMOL/L (ref 98–107)
CO2 SERPL-SCNC: 22 MMOL/L (ref 20–31)
CREAT SERPL-MCNC: 0.9 MG/DL (ref 0.5–0.9)
EOSINOPHIL # BLD: 0.2 K/UL (ref 0–0.4)
EOSINOPHILS RELATIVE PERCENT: 3 % (ref 1–4)
ERYTHROCYTE [DISTWIDTH] IN BLOOD BY AUTOMATED COUNT: 14.2 % (ref 12.5–15.4)
GFR, ESTIMATED: 62 ML/MIN/1.73M2
GLUCOSE BLD-MCNC: 126 MG/DL (ref 65–105)
GLUCOSE SERPL-MCNC: 116 MG/DL (ref 74–99)
HCT VFR BLD AUTO: 40.7 % (ref 36–46)
HGB BLD-MCNC: 13.4 G/DL (ref 12–16)
INR PPP: 1
LYMPHOCYTES NFR BLD: 2.5 K/UL (ref 1–4.8)
LYMPHOCYTES RELATIVE PERCENT: 35 % (ref 24–44)
MCH RBC QN AUTO: 30.6 PG (ref 26–34)
MCHC RBC AUTO-ENTMCNC: 33.1 G/DL (ref 31–37)
MCV RBC AUTO: 92.7 FL (ref 80–100)
MONOCYTES NFR BLD: 0.6 K/UL (ref 0.1–1.2)
MONOCYTES NFR BLD: 8 % (ref 2–11)
NEUTROPHILS NFR BLD: 53 % (ref 36–66)
NEUTS SEG NFR BLD: 3.8 K/UL (ref 1.8–7.7)
PARTIAL THROMBOPLASTIN TIME: 22 SEC (ref 21.3–31.3)
PLATELET # BLD AUTO: 222 K/UL (ref 140–450)
PMV BLD AUTO: 9 FL (ref 6–12)
POTASSIUM SERPL-SCNC: 4 MMOL/L (ref 3.7–5.3)
PROT SERPL-MCNC: 6.4 G/DL (ref 6.6–8.7)
PROTHROMBIN TIME: 10.3 SEC (ref 9.4–12.6)
RBC # BLD AUTO: 4.39 M/UL (ref 4–5.2)
SODIUM SERPL-SCNC: 138 MMOL/L (ref 136–145)
WBC OTHER # BLD: 7.3 K/UL (ref 3.5–11)

## 2025-04-09 PROCEDURE — 80053 COMPREHEN METABOLIC PANEL: CPT

## 2025-04-09 PROCEDURE — 96374 THER/PROPH/DIAG INJ IV PUSH: CPT

## 2025-04-09 PROCEDURE — 99285 EMERGENCY DEPT VISIT HI MDM: CPT

## 2025-04-09 PROCEDURE — 93005 ELECTROCARDIOGRAM TRACING: CPT | Performed by: EMERGENCY MEDICINE

## 2025-04-09 PROCEDURE — 85610 PROTHROMBIN TIME: CPT

## 2025-04-09 PROCEDURE — 82947 ASSAY GLUCOSE BLOOD QUANT: CPT

## 2025-04-09 PROCEDURE — 85730 THROMBOPLASTIN TIME PARTIAL: CPT

## 2025-04-09 PROCEDURE — 6360000002 HC RX W HCPCS: Performed by: EMERGENCY MEDICINE

## 2025-04-09 PROCEDURE — 36415 COLL VENOUS BLD VENIPUNCTURE: CPT

## 2025-04-09 PROCEDURE — 85025 COMPLETE CBC W/AUTO DIFF WBC: CPT

## 2025-04-09 RX ORDER — IOPAMIDOL 755 MG/ML
75 INJECTION, SOLUTION INTRAVASCULAR
Status: COMPLETED | OUTPATIENT
Start: 2025-04-09 | End: 2025-04-10

## 2025-04-09 RX ORDER — DROPERIDOL 2.5 MG/ML
0.62 INJECTION, SOLUTION INTRAMUSCULAR; INTRAVENOUS EVERY 6 HOURS PRN
Status: DISCONTINUED | OUTPATIENT
Start: 2025-04-09 | End: 2025-04-10 | Stop reason: HOSPADM

## 2025-04-09 RX ORDER — 0.9 % SODIUM CHLORIDE 0.9 %
70 INTRAVENOUS SOLUTION INTRAVENOUS ONCE
Status: COMPLETED | OUTPATIENT
Start: 2025-04-10 | End: 2025-04-10

## 2025-04-09 RX ADMIN — DROPERIDOL 0.62 MG: 2.5 INJECTION, SOLUTION INTRAMUSCULAR; INTRAVENOUS at 23:59

## 2025-04-09 ASSESSMENT — PAIN SCALES - GENERAL: PAINLEVEL_OUTOF10: 7

## 2025-04-09 ASSESSMENT — PAIN DESCRIPTION - LOCATION: LOCATION: HEAD

## 2025-04-09 ASSESSMENT — PAIN - FUNCTIONAL ASSESSMENT: PAIN_FUNCTIONAL_ASSESSMENT: 0-10

## 2025-04-10 ENCOUNTER — APPOINTMENT (OUTPATIENT)
Dept: CT IMAGING | Facility: CLINIC | Age: 85
End: 2025-04-10
Attending: EMERGENCY MEDICINE
Payer: COMMERCIAL

## 2025-04-10 VITALS
HEIGHT: 63 IN | DIASTOLIC BLOOD PRESSURE: 58 MMHG | WEIGHT: 200.62 LBS | SYSTOLIC BLOOD PRESSURE: 160 MMHG | OXYGEN SATURATION: 97 % | RESPIRATION RATE: 16 BRPM | HEART RATE: 67 BPM | BODY MASS INDEX: 35.55 KG/M2 | TEMPERATURE: 98 F

## 2025-04-10 PROCEDURE — 96375 TX/PRO/DX INJ NEW DRUG ADDON: CPT

## 2025-04-10 PROCEDURE — 2580000003 HC RX 258: Performed by: EMERGENCY MEDICINE

## 2025-04-10 PROCEDURE — 6360000004 HC RX CONTRAST MEDICATION: Performed by: EMERGENCY MEDICINE

## 2025-04-10 PROCEDURE — 70498 CT ANGIOGRAPHY NECK: CPT

## 2025-04-10 PROCEDURE — 6360000002 HC RX W HCPCS: Performed by: EMERGENCY MEDICINE

## 2025-04-10 RX ORDER — KETOROLAC TROMETHAMINE 15 MG/ML
15 INJECTION, SOLUTION INTRAMUSCULAR; INTRAVENOUS ONCE
Status: COMPLETED | OUTPATIENT
Start: 2025-04-10 | End: 2025-04-10

## 2025-04-10 RX ORDER — DIPHENHYDRAMINE HYDROCHLORIDE 50 MG/ML
50 INJECTION, SOLUTION INTRAMUSCULAR; INTRAVENOUS ONCE
Status: COMPLETED | OUTPATIENT
Start: 2025-04-10 | End: 2025-04-10

## 2025-04-10 RX ADMIN — DIPHENHYDRAMINE HYDROCHLORIDE 50 MG: 50 INJECTION INTRAMUSCULAR; INTRAVENOUS at 01:36

## 2025-04-10 RX ADMIN — IOPAMIDOL 75 ML: 755 INJECTION, SOLUTION INTRAVENOUS at 00:16

## 2025-04-10 RX ADMIN — SODIUM CHLORIDE 70 ML: 9 INJECTION, SOLUTION INTRAVENOUS at 00:15

## 2025-04-10 RX ADMIN — KETOROLAC TROMETHAMINE 15 MG: 15 INJECTION, SOLUTION INTRAMUSCULAR; INTRAVENOUS at 01:38

## 2025-04-10 ASSESSMENT — PAIN SCALES - GENERAL
PAINLEVEL_OUTOF10: 6
PAINLEVEL_OUTOF10: 2

## 2025-04-10 ASSESSMENT — PAIN DESCRIPTION - LOCATION
LOCATION: HEAD
LOCATION: HEAD

## 2025-04-10 ASSESSMENT — PAIN DESCRIPTION - DESCRIPTORS: DESCRIPTORS: ACHING

## 2025-04-10 ASSESSMENT — PAIN DESCRIPTION - ORIENTATION: ORIENTATION: RIGHT

## 2025-04-10 NOTE — ED PROVIDER NOTES
[facility]  [address] [facility] [address]  Phone: [phone]      Patient Name:  Shelly Apple  Medical Record Number:  [id number]  YOB: 1940  Date of Service:  4/9/2025  Primary Care Physician:  Homero Oscar MD      CHIEF COMPLAINT:       Chief Complaint   Patient presents with    Headache    Vision Change       HISTORY OF PRESENT ILLNESS:   Shelly Apple is a 85 y.o. female who presents with the complaint of a headache.  The patient reports that starting approximately 3 weeks ago she developed a gradual onset, constant, progressive, pain to her right parietal scalp that has progressively gotten worse and is now causing her a waxing and waning right parietal headache.  The patient states that her scalp is tender to palpation but she denies any skin lesions to the area.  She denies any head injury or falls.  The patient does not have any history of chronic headaches or migraines.  She complains of associated nausea and photophobia but denies any phonophobia or vomiting.  The patient states that she will intermittently have blurry vision when the pain is intense.  She has not had a recent eye exam and does wear glasses.  The patient states that she has not taken her blood pressure medications yet tonight but normally takes them as prescribed.  She has not taken any medications for pain and does not list any other provoking or palliating factors.  She denies fever, chills, vision loss, double vision, neck pain, neck stiffness, back pain, chest pain, shortness of breath, abdominal pain, urinary/bowel symptoms, focal weakness, numbness, tingling, dizziness, lightheadedness, syncope, recent injury or illness.    CURRENT MEDICATIONS:      Discharge Medication List as of 4/10/2025  1:57 AM        CONTINUE these medications which have NOT CHANGED    Details   Rosuvastatin Calcium 10 MG CPSP Take 10 mg by mouth every evening, Disp-90 capsule, R-0Normal      clopidogrel  test.  Nontender and supple with no nuchal rigidity, full range of motion  PULMONARY: clear to auscultation without wheezes, rhonchi, or rales, normal excursion, no accessory muscle use and no stridor  CARDIOVASCULAR: regular rate, rhythm. Strong radial pulses with intact distal perfusion. Capillary refill <2 seconds.  GASTROINTESTINAL: soft, non-tender, non-distended, no palpable masses, no rebound or guarding   GENITOURINARY: No costovertebral angle tenderness to palpation  MUSCULOSKELETAL: No midline spinal tenderness, step off or deformity. Extremities are otherwise nontender to palpation and nonerythematous. Compartments soft. No peripheral edema.  NEUROLOGIC: alert and oriented x 3, GCS 15, normal mentation and speech. Moves all extremities x 4 without motor or sensory deficit, gait is stable without ataxia.  Cranial nerves II through XII intact.  No cerebellar signs.  No pronator drift.  Normal finger-nose.  Normal visual fields.  PSYCHIATRIC: normal mood and affect, thought process is clear and linear    DIFFERENTIAL DIAGNOSIS:   Subdural, epidural, subarachnoid, skull fracture, closed head injury, concussion, diffuse axonal injury, seizure, hypertensive emergency, acute angle closure glaucoma        PLAN:     Orders Placed This Encounter   Procedures    CTA HEAD NECK W CONTRAST    CBC with Auto Differential    Protime-INR    APTT    Comprehensive Metabolic Panel    PREVIOUS SPECIMEN    Cardiac Monitor - ED Only    POC Glucose Fingerstick    EKG 12 Lead       MEDICATIONS ORDERED:     Orders Placed This Encounter   Medications    DISCONTD: droPERidol (INAPSINE) injection 0.625 mg    sodium chloride 0.9 % bolus 70 mL    iopamidol (ISOVUE-370) 76 % injection 75 mL    diphenhydrAMINE (BENADRYL) injection 50 mg    ketorolac (TORADOL) injection 15 mg       DIAGNOSTIC RESULTS:     EKG   EKG 2225 sinus rhythm, rate 59 bpm, normal axis, normal intervals, no ST elevation or depression, no T wave inversions, good R

## 2025-04-11 LAB
EKG ATRIAL RATE: 57 BPM
EKG Q-T INTERVAL: 434 MS
EKG QRS DURATION: 88 MS
EKG QTC CALCULATION (BAZETT): 429 MS
EKG R AXIS: 1 DEGREES
EKG T AXIS: 71 DEGREES
EKG VENTRICULAR RATE: 59 BPM